# Patient Record
Sex: MALE | Race: WHITE | NOT HISPANIC OR LATINO
[De-identification: names, ages, dates, MRNs, and addresses within clinical notes are randomized per-mention and may not be internally consistent; named-entity substitution may affect disease eponyms.]

---

## 2020-07-28 ENCOUNTER — TRANSCRIPTION ENCOUNTER (OUTPATIENT)
Age: 70
End: 2020-07-28

## 2020-08-18 ENCOUNTER — APPOINTMENT (OUTPATIENT)
Dept: UROLOGY | Facility: CLINIC | Age: 70
End: 2020-08-18
Payer: MEDICARE

## 2020-08-18 VITALS
BODY MASS INDEX: 32.2 KG/M2 | HEIGHT: 71 IN | TEMPERATURE: 98.6 F | RESPIRATION RATE: 17 BRPM | SYSTOLIC BLOOD PRESSURE: 118 MMHG | WEIGHT: 230 LBS | HEART RATE: 75 BPM | DIASTOLIC BLOOD PRESSURE: 75 MMHG

## 2020-08-18 PROCEDURE — 81003 URINALYSIS AUTO W/O SCOPE: CPT | Mod: QW

## 2020-08-18 PROCEDURE — 99213 OFFICE O/P EST LOW 20 MIN: CPT | Mod: 25

## 2020-08-18 RX ORDER — EZETIMIBE 10 MG/1
10 TABLET ORAL
Refills: 0 | Status: ACTIVE | COMMUNITY

## 2020-08-18 RX ORDER — CHROMIUM 200 MCG
TABLET ORAL
Refills: 0 | Status: ACTIVE | COMMUNITY

## 2020-08-18 NOTE — ASSESSMENT
[FreeTextEntry1] : 70 year old male presents for follow up 6 months post biopsy prostate. \par \par Free PSA and UA ordered. \par Will obtain prior records. \par \par FU 6 months pending above

## 2020-08-18 NOTE — PHYSICAL EXAM
[General Appearance - Well Developed] : well developed [Normal Appearance] : normal appearance [General Appearance - Well Nourished] : well nourished [General Appearance - In No Acute Distress] : no acute distress [Edema] : no peripheral edema [Well Groomed] : well groomed [Respiration, Rhythm And Depth] : normal respiratory rhythm and effort [Abdomen Soft] : soft [Exaggerated Use Of Accessory Muscles For Inspiration] : no accessory muscle use [Abdomen Tenderness] : non-tender [Costovertebral Angle Tenderness] : no ~M costovertebral angle tenderness [Urethral Meatus] : meatus normal [Scrotum] : the scrotum was normal [Testes Mass (___cm)] : there were no testicular masses [Anus Abnormality] : the anus and perineum were normal [Rectal Exam - Rectum] : digital rectal exam was normal [Prostate Tenderness] : the prostate was not tender [No Prostate Nodules] : no prostate nodules [Prostate Size ___ (0-4)] : prostate size [unfilled] (scale: 0-4) [Normal Station and Gait] : the gait and station were normal for the patient's age [] : no rash [No Focal Deficits] : no focal deficits [Affect] : the affect was normal [Oriented To Time, Place, And Person] : oriented to person, place, and time [Mood] : the mood was normal [Not Anxious] : not anxious [No Palpable Adenopathy] : no palpable adenopathy [Penis Abnormality] : normal circumcised penis [Testes Tenderness] : no tenderness of the testes [Epididymis] : the epididymides were normal

## 2020-08-18 NOTE — END OF VISIT
[FreeTextEntry3] : Medical record entries made by the scribe today, were at my direction and personally dictated to them by me, Dr. Tadeo Ashford on 08/18/2020. I have reviewed the chart and agree that the record accurately reflects my personal performance of the history, physical exam, assessment, and plan.

## 2020-08-18 NOTE — LETTER BODY
[Dear  ___] : Dear  [unfilled], [Courtesy Letter:] : I had the pleasure of seeing your patient, [unfilled], in my office today. [FreeTextEntry1] : doing well\par see below [FreeTextEntry3] : Tadeo Ashford MD FACS\par \par Attending Urologist-[facility]\par Director - Strategic Operations Urology\par Assistant Clinical Professor-[facility] at [facility]\par \par  [Please see my note below.] : Please see my note below.

## 2020-08-18 NOTE — HISTORY OF PRESENT ILLNESS
[FreeTextEntry1] : 70 year old male presents for follow up for prostate problems, elevated PSA. Pt was seen at my prior office for a prostate biopsy 6 months ago. Genomic tests were ordered at the time as well. Will obtain records. \par Patient denies pain, urinary symptoms.

## 2020-08-27 LAB
PSA FREE FLD-MCNC: 11 %
PSA FREE SERPL-MCNC: 0.63 NG/ML
PSA SERPL-MCNC: 5.5 NG/ML

## 2021-02-02 ENCOUNTER — APPOINTMENT (OUTPATIENT)
Dept: UROLOGY | Facility: CLINIC | Age: 71
End: 2021-02-02

## 2021-02-02 ENCOUNTER — NON-APPOINTMENT (OUTPATIENT)
Age: 71
End: 2021-02-02

## 2021-02-12 ENCOUNTER — RESULT CHARGE (OUTPATIENT)
Age: 71
End: 2021-02-12

## 2021-02-12 ENCOUNTER — APPOINTMENT (OUTPATIENT)
Dept: UROLOGY | Facility: CLINIC | Age: 71
End: 2021-02-12
Payer: MEDICARE

## 2021-02-12 VITALS
DIASTOLIC BLOOD PRESSURE: 77 MMHG | TEMPERATURE: 98.7 F | SYSTOLIC BLOOD PRESSURE: 128 MMHG | HEART RATE: 77 BPM | RESPIRATION RATE: 16 BRPM | OXYGEN SATURATION: 95 %

## 2021-02-12 DIAGNOSIS — R31.29 OTHER MICROSCOPIC HEMATURIA: ICD-10-CM

## 2021-02-12 PROCEDURE — 81003 URINALYSIS AUTO W/O SCOPE: CPT | Mod: QW

## 2021-02-12 PROCEDURE — 99213 OFFICE O/P EST LOW 20 MIN: CPT | Mod: 25

## 2021-02-12 NOTE — HISTORY OF PRESENT ILLNESS
[FreeTextEntry1] : 70 year old male following up elevated PSA, ED\par \par Biopsy 10/2019: benign\par \par MdX done- , 20% likelihood of detecting [place] score < 6 cancer. 12% likelihood of detecting sandy score > 7\par \par PSA 8/27/2020: 5.50, 0.63, 11%-stable\par \par on Cialis- doing well\par \par voiding well

## 2021-02-12 NOTE — END OF VISIT
[FreeTextEntry3] : Medical record entries made by the scribe today, were at my direction and personally dictated to them by me, Dr. Tadeo Ashford on 02/12/2021. I have reviewed the chart and agree that the record accurately reflects my personal performance of the history, physical exam, assessment, and plan.

## 2021-02-12 NOTE — PHYSICAL EXAM
[General Appearance - Well Developed] : well developed [Normal Appearance] : normal appearance [General Appearance - Well Nourished] : well nourished [Well Groomed] : well groomed [General Appearance - In No Acute Distress] : no acute distress [Abdomen Soft] : soft [Abdomen Tenderness] : non-tender [Costovertebral Angle Tenderness] : no ~M costovertebral angle tenderness [Penis Abnormality] : normal circumcised penis [Scrotum] : the scrotum was normal [Rectal Exam - Seminal Vesicles] : the seminal vesicles were normal [Epididymis] : the epididymides were normal [Testes Tenderness] : no tenderness of the testes [Testes Mass (___cm)] : there were no testicular masses [Anus Abnormality] : the anus and perineum were normal [Rectal Exam - Rectum] : digital rectal exam was normal [Prostate Tenderness] : the prostate was not tender [No Prostate Nodules] : no prostate nodules [Prostate Size ___ (0-4)] : prostate size [unfilled] (scale: 0-4)

## 2021-02-12 NOTE — ASSESSMENT
[FreeTextEntry1] : 70 year old male with elevated PSA, ED\par trace hematuria on UA dip\par \par UA micro, culture, cytology sent today\par \par Patient will continue cialis\par \par Await psa results. but in light of prior genomics will consider MRI of the prostate\par \par Follow up in 6 months pending PSA

## 2021-02-13 ENCOUNTER — TRANSCRIPTION ENCOUNTER (OUTPATIENT)
Age: 71
End: 2021-02-13

## 2021-02-13 LAB
APPEARANCE: CLEAR
BACTERIA: NEGATIVE
BILIRUBIN URINE: NEGATIVE
BLOOD URINE: NEGATIVE
COLOR: NORMAL
GLUCOSE QUALITATIVE U: NEGATIVE
HYALINE CASTS: 0 /LPF
KETONES URINE: NEGATIVE
LEUKOCYTE ESTERASE URINE: NEGATIVE
MICROSCOPIC-UA: NORMAL
NITRITE URINE: NEGATIVE
PH URINE: 7
PROTEIN URINE: NEGATIVE
PSA FREE FLD-MCNC: 12 %
PSA FREE SERPL-MCNC: 0.73 NG/ML
PSA SERPL-MCNC: 5.96 NG/ML
RED BLOOD CELLS URINE: 3 /HPF
SPECIFIC GRAVITY URINE: 1.01
SQUAMOUS EPITHELIAL CELLS: 0 /HPF
UROBILINOGEN URINE: NORMAL
WHITE BLOOD CELLS URINE: 0 /HPF

## 2021-02-14 LAB — BACTERIA UR CULT: NORMAL

## 2021-02-18 LAB — URINE CYTOLOGY: NORMAL

## 2021-05-08 ENCOUNTER — RESULT REVIEW (OUTPATIENT)
Age: 71
End: 2021-05-08

## 2021-05-08 ENCOUNTER — APPOINTMENT (OUTPATIENT)
Dept: MRI IMAGING | Facility: IMAGING CENTER | Age: 71
End: 2021-05-08
Payer: MEDICARE

## 2021-05-08 ENCOUNTER — OUTPATIENT (OUTPATIENT)
Dept: OUTPATIENT SERVICES | Facility: HOSPITAL | Age: 71
LOS: 1 days | End: 2021-05-08
Payer: MEDICARE

## 2021-05-08 DIAGNOSIS — R97.20 ELEVATED PROSTATE SPECIFIC ANTIGEN [PSA]: ICD-10-CM

## 2021-05-08 PROCEDURE — 76377 3D RENDER W/INTRP POSTPROCES: CPT

## 2021-05-08 PROCEDURE — G1004: CPT

## 2021-05-08 PROCEDURE — 76377 3D RENDER W/INTRP POSTPROCES: CPT | Mod: 26

## 2021-05-08 PROCEDURE — 72197 MRI PELVIS W/O & W/DYE: CPT

## 2021-05-08 PROCEDURE — 72197 MRI PELVIS W/O & W/DYE: CPT | Mod: 26,MG

## 2021-05-08 PROCEDURE — A9585: CPT

## 2021-05-17 ENCOUNTER — NON-APPOINTMENT (OUTPATIENT)
Age: 71
End: 2021-05-17

## 2021-05-26 ENCOUNTER — APPOINTMENT (OUTPATIENT)
Dept: UROLOGY | Facility: CLINIC | Age: 71
End: 2021-05-26
Payer: MEDICARE

## 2021-05-26 ENCOUNTER — APPOINTMENT (OUTPATIENT)
Dept: UROLOGY | Facility: HOSPITAL | Age: 71
End: 2021-05-26

## 2021-05-26 DIAGNOSIS — Z86.79 PERSONAL HISTORY OF OTHER DISEASES OF THE CIRCULATORY SYSTEM: ICD-10-CM

## 2021-05-26 PROCEDURE — 99213 OFFICE O/P EST LOW 20 MIN: CPT | Mod: 95

## 2021-05-26 NOTE — HISTORY OF PRESENT ILLNESS
[Home] : at home, [unfilled] , at the time of the visit. [Medical Office: ([facility])___] : at the medical office located in  [Verbal consent obtained from patient] : the patient, [unfilled] [FreeTextEntry1] : Patient is a 70 year old man with a history of elevated PSA. He has been referred for a transperineal fusion biopsy of the prostate with Tadeo Carlson MD.  PSA February 2021 5.96 ng.mL with a free PSA of 12%.  He had a MRI of the prostate that demonstrated a PI-RADS 4 lesion, right anterior midgland to apex transition zone. Prostate volume 93 cc. No extraprostatic extension, no seminal vesicle invasion or pelvic lymphadenopathy noted. He denies any family history of prostate cancer. He reports he had a biopsy October 2019 that was benign. He currently denies any bothersome urinary symptoms. \par \par Discussed transperineal fusion guided biopsy with the patient today.  Explained to patient that the MRI images and transrectal ultrasound images allows us to retrieve biopsy samples from the lesion seen on the MRI. We will also take samples from a 12 core biopsy template of the prostate to assess for the presence of clinically significant cancer.  Discussed the use of local anesthesia for this procedure, in addition to a Valium 5 mg tablet. Patient does not want Valium for the procedure. Reviewed the importance of a Fleet enema the morning of the procedure. The patient currently is on Aspirin 81 mg which he will continue. He takes a blood pressure medication which he will take the morning of the procedure. Discussed with patient that a transperineal approach has a low risk for infection. Reviewed with the patient that he may experience some blood in the urine for several days and blood in the ejaculation for a few weeks prior to the procedure.\par

## 2021-05-26 NOTE — HISTORY OF PRESENT ILLNESS
[Home] : at home, [unfilled] , at the time of the visit. [Medical Office: ([facility])___] : at the medical office located in  [Verbal consent obtained from patient] : the patient, [unfilled] [FreeTextEntry1] : Patient is a 70 year old man with a history of elevated PSA. He has been referred for a transperineal fusion biopsy of the prostate with Tadeo Carlson MD.  PSA February 2021 5.96 ng.mL with a free PSA of 12%.  He had a MRI of the prostate that demonstrated a PI-RADS 4 lesion, right anterior midgland to apex transition zone. Prostate volume 93 cc. No extraprostatic extension, no seminal vesicle invasion or pelvic lymphadenopathy noted. He denies any family history of prostate cancer. He reports he had a biopsy October 2019 that was benign. He currently denies any bothersome urinary symptoms. \par \par Discussed transperineal fusion guided biopsy with the patient today.  Explained to patient that the MRI images and transrectal ultrasound images allows us to retrieve biopsy samples from the lesion seen on the MRI. We will also take samples from a 12 core biopsy template of the prostate to assess for the presence of clinically significant cancer.  Discussed the use of local anesthesia for this procedure, in addition to a Valium 5 mg tablet. Patient does not want Valium for the procedure. Reviewed the importance of a Fleet enema the morning of the procedure. The patient currently is on Aspirin 81 mg which he will continue. He takes a blood pressure medication which he will take the morning of the procedure. Discussed with patient that a transperineal approach has a low risk for infection. Reviewed with the patient that he may experience some blood in the urine for several days and blood in the ejaculation for a few weeks prior to the procedure.\par

## 2021-05-26 NOTE — PHYSICAL EXAM
[General Appearance - Well Developed] : well developed [General Appearance - Well Nourished] : well nourished [Normal Appearance] : normal appearance [Well Groomed] : well groomed [General Appearance - In No Acute Distress] : no acute distress [] : no respiratory distress [Exaggerated Use Of Accessory Muscles For Inspiration] : no accessory muscle use [Oriented To Time, Place, And Person] : oriented to person, place, and time [Affect] : the affect was normal [Mood] : the mood was normal [Not Anxious] : not anxious

## 2021-05-26 NOTE — ASSESSMENT
[FreeTextEntry1] : Patient agrees to move forward with transperineal fusion biopsy with Dr. Tadeo Carlson MD. A written copy of instructions have been sent to the email address on file as requested. Patient has verbalized understanding of the procedure and instructions prior to his biopsy appointment. Arrival time reviewed with patient.\par

## 2021-05-26 NOTE — REASON FOR VISIT
[Home] : at home, [unfilled] , at the time of the visit. [Medical Office: ([facility])___] : at the medical office located in  [Spouse] : spouse [Verbal consent obtained from patient] : the patient, [unfilled] [Follow-up Visit ___] : a follow-up visit  for [unfilled]

## 2021-05-26 NOTE — PHYSICAL EXAM
[General Appearance - Well Developed] : well developed [General Appearance - Well Nourished] : well nourished [Normal Appearance] : normal appearance [Well Groomed] : well groomed [General Appearance - In No Acute Distress] : no acute distress [] : no respiratory distress [Exaggerated Use Of Accessory Muscles For Inspiration] : no accessory muscle use [Oriented To Time, Place, And Person] : oriented to person, place, and time [Not Anxious] : not anxious [Affect] : the affect was normal

## 2021-05-26 NOTE — HISTORY OF PRESENT ILLNESS
[FreeTextEntry1] : \par Patient is a 70 year old man with a history of elevated PSA. He has been referred for a transperineal fusion biopsy of the prostate with Tadeo Carlson MD.  PSA February 2021 5.96 ng.mL with a free PSA of 12%.  He had a MRI of the prostate that demonstrated a PI-RADS 4 lesion, right anterior midgland to apex transition zone. Prostate volume 93 cc. No extraprostatic extension, no seminal vesicle invasion or pelvic lymphadenopathy noted. He denies any family history of prostate cancer. He reports he had a biopsy October 2019 that was benign. He currently denies any bothersome urinary symptoms. \par \par Discussed transperineal fusion guided biopsy with the patient today.  Explained to patient that the MRI images and transrectal ultrasound images allows us to retrieve biopsy samples from the lesion seen on the MRI. We will also take samples from a 12 core biopsy template of the prostate to assess for the presence of clinically significant cancer.  Discussed the use of local anesthesia for this procedure, in addition to a Valium 5 mg tablet. Patient does not want Valium for the procedure. Reviewed the importance of a Fleet enema the morning of the procedure. The patient currently is on Aspirin 81 mg which he will continue. He takes a blood pressure medication which he will take the morning of the procedure. Discussed with patient that a transperineal approach has a low risk for infection. Reviewed with the patient that he may experience some blood in the urine for several days and blood in the ejaculation for a few weeks prior to the procedure.

## 2021-05-26 NOTE — ASSESSMENT
[FreeTextEntry1] : Patient agrees to move forward with transperineal fusion biopsy with Dr. Tadeo Calrson MD. A written copy of instructions have been sent to the email address on file as requested. Patient has verbalized understanding of the procedure and instructions prior to his biopsy appointment. Arrival time reviewed with patient.\par

## 2021-05-26 NOTE — REASON FOR VISIT
[Home] : at home, [unfilled] , at the time of the visit. [Medical Office: ([facility])___] : at the medical office located in  [Spouse] : spouse [Verbal consent obtained from patient] : the patient, [unfilled] [Follow-up Visit ___] : a follow-up visit  for [unfilled]

## 2021-05-27 ENCOUNTER — APPOINTMENT (OUTPATIENT)
Dept: UROLOGY | Facility: CLINIC | Age: 71
End: 2021-05-27
Payer: MEDICARE

## 2021-05-27 ENCOUNTER — OUTPATIENT (OUTPATIENT)
Dept: OUTPATIENT SERVICES | Facility: HOSPITAL | Age: 71
LOS: 1 days | End: 2021-05-27
Payer: MEDICARE

## 2021-05-27 VITALS — SYSTOLIC BLOOD PRESSURE: 119 MMHG | DIASTOLIC BLOOD PRESSURE: 76 MMHG | HEART RATE: 85 BPM

## 2021-05-27 VITALS
SYSTOLIC BLOOD PRESSURE: 131 MMHG | DIASTOLIC BLOOD PRESSURE: 78 MMHG | TEMPERATURE: 97.7 F | HEART RATE: 73 BPM | RESPIRATION RATE: 16 BRPM

## 2021-05-27 DIAGNOSIS — R97.20 ELEVATED PROSTATE SPECIFIC ANTIGEN [PSA]: ICD-10-CM

## 2021-05-27 DIAGNOSIS — R93.5 ABNORMAL FINDINGS ON DIAGNOSTIC IMAGING OF OTHER ABDOMINAL REGIONS, INCLUDING RETROPERITONEUM: ICD-10-CM

## 2021-05-27 DIAGNOSIS — R35.0 FREQUENCY OF MICTURITION: ICD-10-CM

## 2021-05-27 PROCEDURE — 55700: CPT

## 2021-05-27 PROCEDURE — 55700: CPT | Mod: 22

## 2021-05-27 PROCEDURE — 76942 ECHO GUIDE FOR BIOPSY: CPT | Mod: 26,59

## 2021-05-27 PROCEDURE — 76872 US TRANSRECTAL: CPT

## 2021-05-27 PROCEDURE — 76872 US TRANSRECTAL: CPT | Mod: 26

## 2021-05-27 PROCEDURE — 76377 3D RENDER W/INTRP POSTPROCES: CPT | Mod: 26

## 2021-05-27 PROCEDURE — 76942 ECHO GUIDE FOR BIOPSY: CPT | Mod: 59

## 2021-06-02 LAB — CORE LAB BIOPSY: NORMAL

## 2021-06-07 ENCOUNTER — APPOINTMENT (OUTPATIENT)
Dept: UROLOGY | Facility: CLINIC | Age: 71
End: 2021-06-07
Payer: MEDICARE

## 2021-06-07 VITALS
HEIGHT: 71 IN | DIASTOLIC BLOOD PRESSURE: 70 MMHG | RESPIRATION RATE: 17 BRPM | TEMPERATURE: 98.5 F | HEART RATE: 86 BPM | SYSTOLIC BLOOD PRESSURE: 118 MMHG

## 2021-06-07 PROCEDURE — 99214 OFFICE O/P EST MOD 30 MIN: CPT

## 2021-06-07 NOTE — HISTORY OF PRESENT ILLNESS
[FreeTextEntry1] : 70 year old male following up with elevated PSA\par \par MRI Prostate w/wo IV Cont on 5/25/21: PIRADS 4 - \par \par Prostate fusion Bx on 5/27/21, pathology results revealed adenocarcinoma of the prostate,\par Maryjo 6 in right anterior as well as in target lesin\par \par  Prognostic Grade Group 1\par \par Here for discussion\par \par

## 2021-06-07 NOTE — END OF VISIT
[FreeTextEntry3] : Medical record entries made by the scribe today, were at my direction and personally dictated to them by me, Dr. Tadeo Ashford on 06/07/2021. I have reviewed the chart and agree that the record accurately reflects my personal performance of the history, physical exam, assessment, and plan.\par

## 2021-06-07 NOTE — LETTER BODY
[Dear  ___] : Dear  [unfilled], [Courtesy Letter:] : I had the pleasure of seeing your patient, [unfilled], in my office today. [Please see my note below.] : Please see my note below. [Sincerely,] : Sincerely, [FreeTextEntry1] : low risk ca prostate\par likely to follow active surveillance [FreeTextEntry3] : Tadeo Ashford MD FACS\par \par Attending Urologist-[facility]\par Director - Strategic Operations Urology\par Associate Clinical Professor-[facility] at [facility]\par \par

## 2021-06-07 NOTE — ASSESSMENT
[FreeTextEntry1] : 70 year old male with Prostate CA\par \par \par \par Prostate Bx on 5/27/21, pathology results revealed Maryjo 6 adenocarcinoma of the prostate, Prognostic Grade Group 1\par \par \par Treatment options reviewed- active surveillance vs definitive therapy discussed\par \par I explained the patient in detail that active surveillance will include frequent visits for exam and  PSA .\par Every 3 months intiailly with repeat interval biopsy in first year and MRI then and at least q year.\par \par reviewed definitive therapy incl surgery, XRT, cryo, focal therapy among others\par \par . Rationale, benefit, risk, and alternatives reviewed. The patient was given a complete description of each option. \par \par Also recommended to see radiation Oncology consultation. Referral was given. All questions were answered.\par \par He will consider his options.but will likely do active surveillance\par \par FU in 3 months pending his choiice\par

## 2021-08-13 ENCOUNTER — APPOINTMENT (OUTPATIENT)
Dept: UROLOGY | Facility: CLINIC | Age: 71
End: 2021-08-13

## 2021-08-20 ENCOUNTER — TRANSCRIPTION ENCOUNTER (OUTPATIENT)
Age: 71
End: 2021-08-20

## 2021-09-14 ENCOUNTER — APPOINTMENT (OUTPATIENT)
Dept: UROLOGY | Facility: CLINIC | Age: 71
End: 2021-09-14
Payer: MEDICARE

## 2021-09-14 ENCOUNTER — RESULT CHARGE (OUTPATIENT)
Age: 71
End: 2021-09-14

## 2021-09-14 VITALS
DIASTOLIC BLOOD PRESSURE: 77 MMHG | SYSTOLIC BLOOD PRESSURE: 130 MMHG | OXYGEN SATURATION: 97 % | WEIGHT: 225 LBS | TEMPERATURE: 98.3 F | BODY MASS INDEX: 31.38 KG/M2 | HEART RATE: 75 BPM

## 2021-09-14 PROCEDURE — 99213 OFFICE O/P EST LOW 20 MIN: CPT

## 2021-09-14 NOTE — PHYSICAL EXAM
[General Appearance - Well Developed] : well developed [General Appearance - Well Nourished] : well nourished [Normal Appearance] : normal appearance [Well Groomed] : well groomed [General Appearance - In No Acute Distress] : no acute distress [Abdomen Soft] : soft [Abdomen Tenderness] : non-tender [Costovertebral Angle Tenderness] : no ~M costovertebral angle tenderness [Penis Abnormality] : normal circumcised penis [Scrotum] : the scrotum was normal [Rectal Exam - Seminal Vesicles] : the seminal vesicles were normal [Epididymis] : the epididymides were normal [Testes Tenderness] : no tenderness of the testes [Testes Mass (___cm)] : there were no testicular masses [Anus Abnormality] : the anus and perineum were normal [Rectal Exam - Rectum] : digital rectal exam was normal [Prostate Tenderness] : the prostate was not tender [No Prostate Nodules] : no prostate nodules [Prostate Size ___ (0-4)] : prostate size [unfilled] (scale: 0-4) [Urethral Meatus] : meatus normal

## 2021-09-16 RX ORDER — ROSUVASTATIN CALCIUM 20 MG/1
20 TABLET, FILM COATED ORAL
Qty: 90 | Refills: 0 | Status: ACTIVE | COMMUNITY
Start: 2021-03-22

## 2021-09-16 RX ORDER — LOSARTAN POTASSIUM AND HYDROCHLOROTHIAZIDE 12.5; 5 MG/1; MG/1
50-12.5 TABLET ORAL
Qty: 90 | Refills: 0 | Status: ACTIVE | COMMUNITY
Start: 2021-08-22

## 2021-09-16 NOTE — HISTORY OF PRESENT ILLNESS
[FreeTextEntry1] : 71 year old male with CA prostate\par PSA 9/14/21: 7.73\par \par PSA 2/13/21: 5.96, 0.73, 12%\par \par MRI Prostate w/wo IV Cont on 5/25/21: PIRADS 4 - \par \par Prostate fusion Bx on 5/27/21, pathology results revealed adenocarcinoma of the prostate,\par [place] 6 in right anterior as well as in target lesion\par \par  Prognostic Grade Group 1\par \par pt is doing well with no complaints\par \par On AS

## 2021-09-16 NOTE — END OF VISIT
[FreeTextEntry3] : Medical record entries made by the scribe today, were at my direction and personally dictated to them by me, Dr. Tadeo Ashford on 09/14/2021. I have reviewed the chart and agree that the record accurately reflects my personal performance of the history, physical exam, assessment, and plan.

## 2021-09-16 NOTE — ASSESSMENT
[FreeTextEntry1] : 71 year old male with prostate cancer- on AS\par pt is doing well\par recent PSA reviewed\par once again discussed need for frequent visits for exam and PSA\par \par To follow up in 3 months- will send for MRI at this time\par eventual interval biopsy

## 2021-12-28 ENCOUNTER — RESULT CHARGE (OUTPATIENT)
Age: 71
End: 2021-12-28

## 2021-12-28 ENCOUNTER — APPOINTMENT (OUTPATIENT)
Dept: UROLOGY | Facility: CLINIC | Age: 71
End: 2021-12-28
Payer: MEDICARE

## 2021-12-28 VITALS
HEART RATE: 76 BPM | SYSTOLIC BLOOD PRESSURE: 134 MMHG | BODY MASS INDEX: 36.22 KG/M2 | TEMPERATURE: 98.1 F | HEIGHT: 70 IN | OXYGEN SATURATION: 95 % | DIASTOLIC BLOOD PRESSURE: 86 MMHG | WEIGHT: 253 LBS

## 2021-12-28 PROCEDURE — 81003 URINALYSIS AUTO W/O SCOPE: CPT | Mod: QW

## 2021-12-28 PROCEDURE — 99213 OFFICE O/P EST LOW 20 MIN: CPT

## 2021-12-30 ENCOUNTER — NON-APPOINTMENT (OUTPATIENT)
Age: 71
End: 2021-12-30

## 2021-12-30 LAB
APPEARANCE: CLEAR
BACTERIA: NEGATIVE
BILIRUBIN URINE: NEGATIVE
BLOOD URINE: ABNORMAL
COLOR: NORMAL
GLUCOSE QUALITATIVE U: NEGATIVE
HYALINE CASTS: 0 /LPF
KETONES URINE: NEGATIVE
LEUKOCYTE ESTERASE URINE: NEGATIVE
MICROSCOPIC-UA: NORMAL
NITRITE URINE: NEGATIVE
PH URINE: 6.5
PROTEIN URINE: NEGATIVE
PSA SERPL-MCNC: 7.25 NG/ML
RED BLOOD CELLS URINE: 1 /HPF
SPECIFIC GRAVITY URINE: 1.02
SQUAMOUS EPITHELIAL CELLS: 0 /HPF
UROBILINOGEN URINE: NORMAL
WHITE BLOOD CELLS URINE: 0 /HPF

## 2022-01-05 LAB
BACTERIA UR CULT: ABNORMAL
URINE CYTOLOGY: NORMAL

## 2022-01-06 ENCOUNTER — NON-APPOINTMENT (OUTPATIENT)
Age: 72
End: 2022-01-06

## 2022-01-09 ENCOUNTER — RESULT REVIEW (OUTPATIENT)
Age: 72
End: 2022-01-09

## 2022-01-09 ENCOUNTER — OUTPATIENT (OUTPATIENT)
Dept: OUTPATIENT SERVICES | Facility: HOSPITAL | Age: 72
LOS: 1 days | End: 2022-01-09
Payer: MEDICARE

## 2022-01-09 ENCOUNTER — APPOINTMENT (OUTPATIENT)
Dept: MRI IMAGING | Facility: IMAGING CENTER | Age: 72
End: 2022-01-09
Payer: MEDICARE

## 2022-01-09 DIAGNOSIS — C61 MALIGNANT NEOPLASM OF PROSTATE: ICD-10-CM

## 2022-01-09 PROCEDURE — A9585: CPT

## 2022-01-09 PROCEDURE — 76498 UNLISTED MR PROCEDURE: CPT

## 2022-01-09 PROCEDURE — 72197 MRI PELVIS W/O & W/DYE: CPT | Mod: ME

## 2022-01-09 PROCEDURE — 76498P: CUSTOM | Mod: 26,MH

## 2022-01-09 PROCEDURE — G1004: CPT

## 2022-01-09 PROCEDURE — 72197 MRI PELVIS W/O & W/DYE: CPT | Mod: 26,ME

## 2022-01-09 NOTE — PHYSICAL EXAM
[General Appearance - Well Developed] : well developed [General Appearance - Well Nourished] : well nourished [Normal Appearance] : normal appearance [Well Groomed] : well groomed [General Appearance - In No Acute Distress] : no acute distress [Abdomen Soft] : soft [Abdomen Tenderness] : non-tender [Costovertebral Angle Tenderness] : no ~M costovertebral angle tenderness [Urethral Meatus] : meatus normal [Penis Abnormality] : normal circumcised penis [Scrotum] : the scrotum was normal [Rectal Exam - Seminal Vesicles] : the seminal vesicles were normal [Epididymis] : the epididymides were normal [Testes Tenderness] : no tenderness of the testes [Testes Mass (___cm)] : there were no testicular masses [Anus Abnormality] : the anus and perineum were normal [Rectal Exam - Rectum] : digital rectal exam was normal [Prostate Tenderness] : the prostate was not tender [No Prostate Nodules] : no prostate nodules [Prostate Size ___ gm] : prostate size [unfilled] gm [Prostate Size ___ (0-4)] : prostate size [unfilled] (scale: 0-4)

## 2022-01-09 NOTE — HISTORY OF PRESENT ILLNESS
[FreeTextEntry1] : 71 year old male on AS for prostate cancer (Sandy 3+3=6 )\par \par Fusion Biopsy 5/27/21: adenocarcinoma of the prostate, [place] 6 in right anterior as well as in target lesion Prognostic Grade Group 1\par \par MRI Prostate 5/25/21: PIRADS 4\par \par MdX done- , 20% likelihood of detecting [place] score < 6 cancer. 12% likelihood of detecting sandy score > 7\par \par Biopsy 10/2019: benign\par \par PSA 9/14/21: 7.73\par PSA 2/13/21: 5.96, 0.73, 12%\par PSA 8/27/2020: 5.50, 0.63, 11%\par

## 2022-01-09 NOTE — ADDENDUM
[FreeTextEntry1] : Medical record entries made by the scribe today, were at my direction and personally dictated to them by me, Dr. Tadeo Ashford on 12/28/2021. I have reviewed the chart and agree that the record accurately reflects my personal performance of the history, physical exam, assessment, and plan.

## 2022-01-09 NOTE — ASSESSMENT
[FreeTextEntry1] : 71 year old male on AS for prostate cancer (Maryjo 3+3=6), microhematuria- on UA dip\par \par \par UA micro, culture, cytology, PSA sent today\par \par to go for MR Prostate\par \par interval biopsy pending \par \par Follow up in 3 months

## 2022-01-09 NOTE — END OF VISIT
[FreeTextEntry3] : Medical record entries made by the scribe today, were at my direction and personally dictated to them by me, Dr. Tadeo Ashford on 12/28/2021. I have reviewed the chart and agree that the record accurately reflects my personal performance of the history, physical exam, assessment, and plan.

## 2022-01-21 ENCOUNTER — APPOINTMENT (OUTPATIENT)
Dept: UROLOGY | Facility: CLINIC | Age: 72
End: 2022-01-21
Payer: MEDICARE

## 2022-01-21 VITALS
OXYGEN SATURATION: 96 % | TEMPERATURE: 98 F | WEIGHT: 252 LBS | BODY MASS INDEX: 36.08 KG/M2 | DIASTOLIC BLOOD PRESSURE: 75 MMHG | HEIGHT: 70 IN | SYSTOLIC BLOOD PRESSURE: 113 MMHG | HEART RATE: 76 BPM

## 2022-01-21 DIAGNOSIS — N39.0 URINARY TRACT INFECTION, SITE NOT SPECIFIED: ICD-10-CM

## 2022-01-21 PROCEDURE — 99213 OFFICE O/P EST LOW 20 MIN: CPT

## 2022-01-22 PROBLEM — N39.0 ACUTE LOWER UTI: Status: RESOLVED | Noted: 2022-01-05 | Resolved: 2022-02-04

## 2022-01-22 LAB
APPEARANCE: CLEAR
BACTERIA: NEGATIVE
BILIRUBIN URINE: NEGATIVE
BLOOD URINE: NORMAL
COLOR: YELLOW
GLUCOSE QUALITATIVE U: NORMAL
HYALINE CASTS: 1 /LPF
KETONES URINE: NEGATIVE
LEUKOCYTE ESTERASE URINE: NEGATIVE
MICROSCOPIC-UA: NORMAL
NITRITE URINE: NEGATIVE
PH URINE: 5.5
PROTEIN URINE: NEGATIVE
PSA SERPL-MCNC: 7 NG/ML
RED BLOOD CELLS URINE: 3 /HPF
SPECIFIC GRAVITY URINE: 1.02
SQUAMOUS EPITHELIAL CELLS: 0 /HPF
UROBILINOGEN URINE: NORMAL
WHITE BLOOD CELLS URINE: 0 /HPF

## 2022-01-22 RX ORDER — CEFDINIR 300 MG/1
300 CAPSULE ORAL TWICE DAILY
Qty: 14 | Refills: 0 | Status: DISCONTINUED | COMMUNITY
Start: 2022-01-05 | End: 2022-01-22

## 2022-01-22 NOTE — ASSESSMENT
[FreeTextEntry1] : 71 year old male on AS for prostate cancer (Maryjo 3+3=6)\par \par UA micro, culture, PSA sent  today\par \par reviewed MRI with patient-PIRAD 4\par \par discussed need for interval fusion biopsy\par \par Follow up with Dr. Carlson for fusion biopsy at 450 then f/u here week later

## 2022-01-22 NOTE — HISTORY OF PRESENT ILLNESS
[FreeTextEntry1] : 71 year old male on AS for prostate cancer (Sandy 3+3=6 )\par \par Biopsy 10/2019: benign-however MdX done- , 20% likelihood of detecting [place] score < 6 cancer. 12% likelihood of detecting sandy score > 7\par \par MRI Prostate 5//21: PIRADS 4\par \par Fusion Biopsy 5/27/21: adenocarcinoma of the prostate, [place] 6 in right anterior as well as in target lesion Prognostic Grade Group 1\par \par \par MRI Prostate 1/9/22:  79 cc gland. High suspicion lesion in right anterior peripheral zone unchanged.\par *PIRADS 4 - High (clinically significant cancer is likely to be present)\par \par \par PSA 12/27/21: 7.25- positive culture same day-repeat sent today\par PSA: 2/12/21: 5.96\par PSA 9/19/20: 5.50\par \par positive culture 1/5/22- course of cefidnir\par \par here today for c/s and review of MRI\par \par has had no symptoms of dysuria, fever etc

## 2022-01-22 NOTE — END OF VISIT
[FreeTextEntry3] : Medical record entries made by the scribe today, were at my direction and personally dictated to them by me, Dr. Tadeo Ashford on 01/21/2022. I have reviewed the chart and agree that the record accurately reflects my personal performance of the history, physical exam, assessment, and plan.

## 2022-01-23 LAB — BACTERIA UR CULT: NORMAL

## 2022-02-02 ENCOUNTER — APPOINTMENT (OUTPATIENT)
Dept: UROLOGY | Facility: CLINIC | Age: 72
End: 2022-02-02
Payer: MEDICARE

## 2022-02-02 PROCEDURE — 99212 OFFICE O/P EST SF 10 MIN: CPT | Mod: 95

## 2022-02-02 NOTE — REASON FOR VISIT
[Home] : at home, [unfilled] , at the time of the visit. [Medical Office: ([facility])___] : at the medical office located in  [Verbal consent obtained from patient] : the patient, [unfilled] [Follow-up Visit ___] : a follow-up visit  for [unfilled]

## 2022-02-02 NOTE — PHYSICAL EXAM
[Normal Appearance] : normal appearance [General Appearance - In No Acute Distress] : no acute distress [] : no respiratory distress [Exaggerated Use Of Accessory Muscles For Inspiration] : no accessory muscle use [Oriented To Time, Place, And Person] : oriented to person, place, and time [Not Anxious] : not anxious

## 2022-02-02 NOTE — ASSESSMENT
[FreeTextEntry1] : Discussed transperineal fusion guided biopsy with the patient today.  Explained to patient that the MRI images and transrectal ultrasound images allows us to retrieve biopsy samples from the lesion seen on the MRI. We will also take samples from a 12 core biopsy template of the prostate to assess for the presence of clinically significant cancer.  Discussed the use of local anesthesia for this procedure, in addition to a Valium 5 mg tablet. Patient does not want  PO Valium for this procedure.  Reviewed the importance of a Fleet enema the morning of the procedure. The patient Tales Aspirin 81 mg which he will continue. He takes blood pressure medications, which he will take the morning of the procedure. Discussed with patient that a transperineal approach has a low risk for infection. Reviewed with the patient that he may experience some blood in the urine for several days and blood in the ejaculation for a few weeks after the procedure. Reviewed MRI. Discussed risks and benefits of a transperineal fusion biopsy. All questions answered. \par \par Patient agrees to move forward with transperineal fusion biopsy with Dr. Tadeo Carlson MD. A written copy of instructions have been sent to the email address on file as requested. Patient has verbalized understanding of the procedure and instructions prior to his biopsy appointment. Arrival time reviewed with patient.\par

## 2022-02-02 NOTE — HISTORY OF PRESENT ILLNESS
[FreeTextEntry1] : Patient is a 71 year old man with a history of [place] 6 prostate cancer. He has been referred for a transperineal fusion guided biopsy with Tadeo Carlson MD. PSA January 2022 7 ng/mL, PSA December 2021 7.25 ng/mL, PSA February 2021 5.96 ng/mL, with a free PSA of 12%. .A MRI of the prostate was performed which demonstrated a 9 x 10 x 13 mm PI-RADS 4 lesion, right anterior peripheral zone. Prostate volume 79 cc. No seminal vesicle invasion, no pelvic lymphadenopathy, no extraprostatic extension noted. He denies any family history of prostate cancer. He denies any bothersome voiding symptoms.\par \par Previous biopsy 2019- benign \par Fusion biopsy performed by Dr. Carlson May 2021 \par 2/13 cores Maryjo (3+3) 6, max involvement 60 %.

## 2022-02-08 ENCOUNTER — APPOINTMENT (OUTPATIENT)
Dept: UROLOGY | Facility: CLINIC | Age: 72
End: 2022-02-08
Payer: MEDICARE

## 2022-02-08 ENCOUNTER — OUTPATIENT (OUTPATIENT)
Dept: OUTPATIENT SERVICES | Facility: HOSPITAL | Age: 72
LOS: 1 days | End: 2022-02-08
Payer: MEDICARE

## 2022-02-08 VITALS — OXYGEN SATURATION: 94 % | SYSTOLIC BLOOD PRESSURE: 138 MMHG | HEART RATE: 86 BPM | DIASTOLIC BLOOD PRESSURE: 76 MMHG

## 2022-02-08 VITALS — DIASTOLIC BLOOD PRESSURE: 79 MMHG | SYSTOLIC BLOOD PRESSURE: 127 MMHG

## 2022-02-08 DIAGNOSIS — R97.20 ELEVATED PROSTATE SPECIFIC ANTIGEN [PSA]: ICD-10-CM

## 2022-02-08 DIAGNOSIS — R97.20 ELEVATED PROSTATE, SPECIFIC ANTIGEN [PSA]: ICD-10-CM

## 2022-02-08 DIAGNOSIS — R93.5 ABNORMAL FINDINGS ON DIAGNOSTIC IMAGING OF OTHER ABDOMINAL REGIONS, INCLUDING RETROPERITONEUM: ICD-10-CM

## 2022-02-08 DIAGNOSIS — R35.0 FREQUENCY OF MICTURITION: ICD-10-CM

## 2022-02-08 DIAGNOSIS — C61 MALIGNANT NEOPLASM OF PROSTATE: ICD-10-CM

## 2022-02-08 PROCEDURE — 76377 3D RENDER W/INTRP POSTPROCES: CPT | Mod: 26

## 2022-02-08 PROCEDURE — 55700: CPT | Mod: 22

## 2022-02-08 PROCEDURE — 76942 ECHO GUIDE FOR BIOPSY: CPT | Mod: 26,59

## 2022-02-08 PROCEDURE — 76942 ECHO GUIDE FOR BIOPSY: CPT | Mod: 59

## 2022-02-08 PROCEDURE — 76872 US TRANSRECTAL: CPT

## 2022-02-08 PROCEDURE — 55700: CPT

## 2022-02-09 ENCOUNTER — NON-APPOINTMENT (OUTPATIENT)
Age: 72
End: 2022-02-09

## 2022-02-12 LAB — CORE LAB BIOPSY: NORMAL

## 2022-02-15 ENCOUNTER — APPOINTMENT (OUTPATIENT)
Dept: UROLOGY | Facility: CLINIC | Age: 72
End: 2022-02-15
Payer: MEDICARE

## 2022-02-15 PROCEDURE — 99214 OFFICE O/P EST MOD 30 MIN: CPT

## 2022-02-18 NOTE — ASSESSMENT
[FreeTextEntry1] : 71 year old male on AS for Maryjo 6 prostate ca\par \par 30 minute discussion with pt regarding pathology and options in great detail \par \par This demonstrates similar path but sl increase in volume over prior biopsy\par It is still low risk disease and compatible with AS\par \par I did spend a fair bit of time reviewing definitive therapy if he choses\par \par Reviewed all options surrounding definitive therapy\par Recommended definitive therapy, discussed radiation, surg, XRT, HIFU, and cryo, amongst others. \par Risks/benefits reviewed as well - answered all questions. Pt understood.  \par \par He is inclined to continue AS at this time but would like to have a consultation with Aldo re: possible RARP\par \par Will also make 3 month  f/u here should he continue with AS\par Again discussed need for careful f/u and probable repeat biopsy in one year opr earlier, \par

## 2022-02-18 NOTE — END OF VISIT
[FreeTextEntry3] : Medical record entries made by the scribe today, were at my direction and personally dictated to them by me, Dr. Tadeo Ashford on 02/15/2022. I have reviewed the chart and agree that the record accurately reflects my personal performance of the history, physical exam, assessment, and plan.

## 2022-02-18 NOTE — HISTORY OF PRESENT ILLNESS
[FreeTextEntry1] : 71 year old male on AS for Maryjo 6 prostate ca here today to discuss biopsy results \par \par MRI Prostate 5/8/21: PIRADS 4\par \par Fusion Biopsy 5/27/21: adenocarcinoma of the prostate, Maryjo 6 in right anterior as well as in target lesion Prognostic Grade Group 1\par \par MRI Prostate 1/9/22: 79 cc gland. High suspicion lesion in right anterior peripheral zone unchanged.\par *PIRADS 4 \par \par most recent biopsy 2/8/2022: Maryjo 6 in right anterior involving 70% of 1 of 1 core and [place] 6 in MRI target lesion 1 right anterior PZ involving 70% and 25% of 3 of 3 cores - similar results to biopsy from last year but sl increase volume\par \par most recent PSA on 1/20/2022: 7 \par PSA 12/27/2021: 7.25 \par PSA 2/12/2021: 5.96 \par PSA 8/19/2020: 5.50 \par \par UA micro and culture 1/20/2022: both negative

## 2022-02-18 NOTE — LETTER BODY
[Dear  ___] : Dear  [unfilled], [Courtesy Letter:] : I had the pleasure of seeing your patient, [unfilled], in my office today. [Please see my note below.] : Please see my note below. [Sincerely,] : Sincerely, [FreeTextEntry1] : see below\par \par sl increase in disease volume but will likely stay on AS for now [FreeTextEntry3] : Tadeo Ashford MD FACS\par \par Attending Urologist-[facility]\par Chief-Urology Division [facility]\par Associate Clinical Professor-[employer] [facility] [employer] at [facility]\par \par

## 2022-02-24 ENCOUNTER — APPOINTMENT (OUTPATIENT)
Dept: UROLOGY | Facility: CLINIC | Age: 72
End: 2022-02-24
Payer: MEDICARE

## 2022-02-24 VITALS
SYSTOLIC BLOOD PRESSURE: 121 MMHG | WEIGHT: 252 LBS | HEIGHT: 70 IN | HEART RATE: 76 BPM | TEMPERATURE: 98 F | DIASTOLIC BLOOD PRESSURE: 66 MMHG | BODY MASS INDEX: 36.08 KG/M2

## 2022-02-24 PROCEDURE — 99215 OFFICE O/P EST HI 40 MIN: CPT

## 2022-03-29 ENCOUNTER — APPOINTMENT (OUTPATIENT)
Dept: UROLOGY | Facility: CLINIC | Age: 72
End: 2022-03-29

## 2022-05-24 ENCOUNTER — APPOINTMENT (OUTPATIENT)
Dept: UROLOGY | Facility: CLINIC | Age: 72
End: 2022-05-24
Payer: MEDICARE

## 2022-05-24 VITALS
DIASTOLIC BLOOD PRESSURE: 74 MMHG | OXYGEN SATURATION: 97 % | WEIGHT: 252 LBS | HEART RATE: 73 BPM | BODY MASS INDEX: 36.16 KG/M2 | SYSTOLIC BLOOD PRESSURE: 111 MMHG | TEMPERATURE: 98.2 F

## 2022-05-24 PROCEDURE — 81003 URINALYSIS AUTO W/O SCOPE: CPT | Mod: QW

## 2022-05-24 PROCEDURE — 99213 OFFICE O/P EST LOW 20 MIN: CPT

## 2022-05-24 RX ORDER — LOSARTAN POTASSIUM 50 MG/1
50 TABLET, FILM COATED ORAL
Refills: 0 | Status: DISCONTINUED | COMMUNITY
End: 2022-05-24

## 2022-05-24 RX ORDER — PRAVASTATIN SODIUM 40 MG/1
40 TABLET ORAL
Refills: 0 | Status: DISCONTINUED | COMMUNITY
End: 2022-05-24

## 2022-05-29 LAB
APPEARANCE: CLEAR
BACTERIA UR CULT: NORMAL
BACTERIA: NEGATIVE
BILIRUBIN URINE: NEGATIVE
BLOOD URINE: NEGATIVE
COLOR: YELLOW
GLUCOSE QUALITATIVE U: NEGATIVE
HYALINE CASTS: 0 /LPF
KETONES URINE: NEGATIVE
LEUKOCYTE ESTERASE URINE: NEGATIVE
MICROSCOPIC-UA: NORMAL
NITRITE URINE: NEGATIVE
PH URINE: 6
PROTEIN URINE: NORMAL
RED BLOOD CELLS URINE: 2 /HPF
SPECIFIC GRAVITY URINE: 1.02
SQUAMOUS EPITHELIAL CELLS: 0 /HPF
URINE CYTOLOGY: NORMAL
UROBILINOGEN URINE: NORMAL
WHITE BLOOD CELLS URINE: 0 /HPF

## 2022-05-29 NOTE — END OF VISIT
[FreeTextEntry3] : Medical record entries made by the scribe today, were at my direction and personally dictated to them by me, Dr. Tadeo Ashford on 05/24/2022. I have reviewed the chart and agree that the record accurately reflects my personal performance of the history, physical exam, assessment, and plan.

## 2022-05-29 NOTE — ASSESSMENT
[FreeTextEntry1] : 71 year old male with Maryjo 6 prostate ca on AS \par \par UA micro, culture, cytology PSA sent today \par \par pt refused EMMANUEL today\par \par will RTO early next week for exam \par \par cont AS-MRI  Feb 23

## 2022-05-29 NOTE — HISTORY OF PRESENT ILLNESS
[FreeTextEntry1] : 71 year old male with h/o elevated PSA and [place] 6 prostate ca on AS here today for follow up \par \par MRI prostate 1/9/22: 79 cc gland. *PIRADS 4 \par \par biopsy 2/8/2022: Maryjo 6 in right anterior involving 70% of 1 of 1 core and Maryjo 6 in MRI target lesion 1 right anterior PZ involving 70% and 25% of 3 of 3 cores - similar results to biopsy from last year but sl increase volume\par \par PSA trend-\par 1/20/22: 7 - elevated \par 12/27/21: 7.25\par 2/12/21: 5.96\par 8/19/20: 5.50 \par \par today, pt notes doing well overall \par no complaints \par \par recently saw Aldo re: definitive treatments including focal therapy-pt wants to continue AS

## 2022-05-31 ENCOUNTER — APPOINTMENT (OUTPATIENT)
Dept: UROLOGY | Facility: CLINIC | Age: 72
End: 2022-05-31
Payer: MEDICARE

## 2022-05-31 VITALS
OXYGEN SATURATION: 97 % | TEMPERATURE: 97.8 F | BODY MASS INDEX: 36.08 KG/M2 | DIASTOLIC BLOOD PRESSURE: 71 MMHG | HEART RATE: 67 BPM | WEIGHT: 252 LBS | HEIGHT: 70 IN | SYSTOLIC BLOOD PRESSURE: 115 MMHG

## 2022-05-31 PROCEDURE — 99213 OFFICE O/P EST LOW 20 MIN: CPT

## 2022-05-31 NOTE — HISTORY OF PRESENT ILLNESS
[FreeTextEntry1] : 71 year old male with h/o elevated PSA and Maryjo 6 prostate ca on AS here today for follow up \par \par MRI prostate 1/9/2022: PIRADS 4 \par \par biopsy 2/8/2022: [place] 6 in right anterior involving 70% of 1 of 1 core and [place] 6 in MRI target lesion 1 right anterior PZ involving 70% and 25% of 3 of 3 cores - \par \par *similar results to biopsy from last year but sl increase volume\par \par PSA trend-\par 5/23/22: 7.85 - elevated \par 1/20/22: 7  \par 12/27/21: 7.25 \par 2/12/21: 5.96 \par 8/19/20: 5.50 \par \par refused EMMANUEL last week- here today for exam and to  review labs\par \par today, pt notes doing well \par no complaints \par \par UA micro 5/24/2022: negative \par culture 5/24/2022: negative \par cytology 5/24/2022: negative

## 2022-05-31 NOTE — ASSESSMENT
[FreeTextEntry1] : 71 year old male with elevated PSA and Maryjo 6 prostate ca on AS \par \par 30 minute d/w pt re changes in biopsy reports and PSA elevation\par \par he has seen Dr. Carlson as well re: definitive therapy including focal therapy\par \par discussed possible significance of progression based on most recent biopsy \par \par also suggested pt to reconsider definitive treatments including focal therapy \par \par he declines for now despite our discussion today\par \par pt understood\par \par f/u in 3 months for psa, exam, and further discussion re options

## 2022-05-31 NOTE — PHYSICAL EXAM
[General Appearance - Well Developed] : well developed [General Appearance - Well Nourished] : well nourished [Normal Appearance] : normal appearance [Well Groomed] : well groomed [General Appearance - In No Acute Distress] : no acute distress [Abdomen Soft] : soft [Abdomen Tenderness] : non-tender [Costovertebral Angle Tenderness] : no ~M costovertebral angle tenderness [Urethral Meatus] : meatus normal [Scrotum] : the scrotum was normal [Testes Mass (___cm)] : there were no testicular masses [No Prostate Nodules] : no prostate nodules [Prostate Size ___ (0-4)] : prostate size [unfilled] (scale: 0-4) [Penis Abnormality] : normal circumcised penis [Epididymis] : the epididymides were normal [Testes Tenderness] : no tenderness of the testes [Anus Abnormality] : the anus and perineum were normal [Rectal Exam - Rectum] : digital rectal exam was normal [Prostate Tenderness] : the prostate was not tender

## 2022-05-31 NOTE — END OF VISIT
[FreeTextEntry3] : Medical record entries made by the scribe today, were at my direction and personally dictated to them by me, Dr. Tadeo Ashford on 05/31/2022. I have reviewed the chart and agree that the record accurately reflects my personal performance of the history, physical exam, assessment, and plan.

## 2022-06-05 LAB — PSA SERPL-MCNC: 7.85 NG/ML

## 2022-09-30 ENCOUNTER — APPOINTMENT (OUTPATIENT)
Dept: UROLOGY | Facility: CLINIC | Age: 72
End: 2022-09-30

## 2022-09-30 VITALS
DIASTOLIC BLOOD PRESSURE: 80 MMHG | OXYGEN SATURATION: 97 % | SYSTOLIC BLOOD PRESSURE: 122 MMHG | TEMPERATURE: 97.8 F | HEART RATE: 74 BPM

## 2022-09-30 LAB
BILIRUB UR QL STRIP: NORMAL
CLARITY UR: CLEAR
COLLECTION METHOD: NORMAL
GLUCOSE UR-MCNC: 250
HCG UR QL: 1 EU/DL
HGB UR QL STRIP.AUTO: NORMAL
KETONES UR-MCNC: NORMAL
LEUKOCYTE ESTERASE UR QL STRIP: NORMAL
NITRITE UR QL STRIP: NORMAL
PH UR STRIP: 5
PROT UR STRIP-MCNC: NORMAL
SP GR UR STRIP: 1.02

## 2022-09-30 PROCEDURE — 99213 OFFICE O/P EST LOW 20 MIN: CPT

## 2022-09-30 NOTE — PHYSICAL EXAM
[Urethral Meatus] : meatus normal [Scrotum] : the scrotum was normal [Prostate Size ___ (0-4)] : prostate size [unfilled] (scale: 0-4) [General Appearance - Well Developed] : well developed [General Appearance - Well Nourished] : well nourished [Normal Appearance] : normal appearance [Well Groomed] : well groomed [General Appearance - In No Acute Distress] : no acute distress [Abdomen Soft] : soft [Abdomen Tenderness] : non-tender [Costovertebral Angle Tenderness] : no ~M costovertebral angle tenderness [Penis Abnormality] : normal circumcised penis [Epididymis] : the epididymides were normal [Testes Tenderness] : no tenderness of the testes [Testes Mass (___cm)] : there were no testicular masses [Anus Abnormality] : the anus and perineum were normal [Rectal Exam - Rectum] : digital rectal exam was normal [Prostate Tenderness] : the prostate was not tender [No Prostate Nodules] : no prostate nodules

## 2022-10-02 LAB
APPEARANCE: CLEAR
BACTERIA: NEGATIVE
BILIRUBIN URINE: NEGATIVE
BLOOD URINE: ABNORMAL
COLOR: YELLOW
GLUCOSE QUALITATIVE U: ABNORMAL
HYALINE CASTS: 0 /LPF
KETONES URINE: NEGATIVE
LEUKOCYTE ESTERASE URINE: NEGATIVE
MICROSCOPIC-UA: NORMAL
NITRITE URINE: NEGATIVE
PH URINE: 6.5
PROTEIN URINE: NEGATIVE
RED BLOOD CELLS URINE: 2 /HPF
SPECIFIC GRAVITY URINE: 1.02
SQUAMOUS EPITHELIAL CELLS: 0 /HPF
UROBILINOGEN URINE: NORMAL
WHITE BLOOD CELLS URINE: 0 /HPF

## 2022-10-02 NOTE — ASSESSMENT
[FreeTextEntry1] : 72 year old male with Maryjo 6 prostate ca on AS \par \par PSA sent today \par \par ua dip today showed trace blood - UA micro sent \par \par pending PSA,\par f/u 3 months \par reviewed possible significance of rising psa during AS\par \par reviewed options of definitive treatment

## 2022-10-02 NOTE — END OF VISIT
[FreeTextEntry3] : Medical record entries made by the scribe today, were at my direction and personally dictated to them by me, Dr. Tadeo Ashford on 09/30/2022. I have reviewed the chart and agree that the record accurately reflects my personal performance of the history, physical exam, assessment, and plan.

## 2022-11-27 LAB — PSA SERPL-MCNC: 9.34 NG/ML

## 2023-01-24 ENCOUNTER — APPOINTMENT (OUTPATIENT)
Dept: UROLOGY | Facility: CLINIC | Age: 73
End: 2023-01-24
Payer: MEDICARE

## 2023-01-24 VITALS
OXYGEN SATURATION: 98 % | DIASTOLIC BLOOD PRESSURE: 74 MMHG | SYSTOLIC BLOOD PRESSURE: 118 MMHG | TEMPERATURE: 97.9 F | HEART RATE: 73 BPM

## 2023-01-24 PROCEDURE — 99213 OFFICE O/P EST LOW 20 MIN: CPT

## 2023-01-24 NOTE — HISTORY OF PRESENT ILLNESS
[FreeTextEntry1] : 72 year old male with [place] 6 prostate ca on AS here today for follow up \par \par UA micro 9/30/22: showed trace blood\par \par PSA trend-\par 9/30/22: 9.34\par 9/26/22: 10.40 - done at pcp\par 5/23/22: 7.85 \par 1/20/22: 7 \par 12/27/21: 7.25 \par 2/12/21: 5.96 \par 8/19/20: 5.50 \par \par MRI 1/9/2022: PIRADS 4 \par \par interval biopsy 2/8/22-one core of sandy 6 plus three similar at target zone\par \par no issues with urination \par denies dysuria, hematuria \par

## 2023-01-24 NOTE — ASSESSMENT
[FreeTextEntry1] : 72 year old male with [place] 6 prostate ca on AS \par \par no new c/o\par \par requests refill of cialis-sent\par \par PSA sent today\par \par to get prostate mr\par \par \par pending mr and psa 6 months unless earlier bx

## 2023-01-24 NOTE — PHYSICAL EXAM
[General Appearance - Well Developed] : well developed [General Appearance - Well Nourished] : well nourished [Normal Appearance] : normal appearance [Well Groomed] : well groomed [General Appearance - In No Acute Distress] : no acute distress [Abdomen Soft] : soft [Abdomen Tenderness] : non-tender [Costovertebral Angle Tenderness] : no ~M costovertebral angle tenderness [Urethral Meatus] : meatus normal [Penis Abnormality] : normal circumcised penis [Scrotum] : the scrotum was normal [Epididymis] : the epididymides were normal [Testes Tenderness] : no tenderness of the testes [Testes Mass (___cm)] : there were no testicular masses [Anus Abnormality] : the anus and perineum were normal [Rectal Exam - Rectum] : digital rectal exam was normal [Prostate Enlargement] : the prostate was not enlarged [Prostate Tenderness] : the prostate was not tender [No Prostate Nodules] : no prostate nodules

## 2023-01-24 NOTE — END OF VISIT
[FreeTextEntry3] : Medical record entries made by the scribe today, were at my direction and personally dictated to them by me, Dr. Tadeo Ashford on 01/24/2023. I have reviewed the chart and agree that the record accurately reflects my personal performance of the history, physical exam, assessment, and plan.\par

## 2023-02-05 ENCOUNTER — APPOINTMENT (OUTPATIENT)
Dept: MRI IMAGING | Facility: IMAGING CENTER | Age: 73
End: 2023-02-05

## 2023-02-11 ENCOUNTER — APPOINTMENT (OUTPATIENT)
Dept: MRI IMAGING | Facility: IMAGING CENTER | Age: 73
End: 2023-02-11

## 2023-02-11 ENCOUNTER — OUTPATIENT (OUTPATIENT)
Dept: OUTPATIENT SERVICES | Facility: HOSPITAL | Age: 73
LOS: 1 days | End: 2023-02-11
Payer: MEDICARE

## 2023-02-11 ENCOUNTER — RESULT REVIEW (OUTPATIENT)
Age: 73
End: 2023-02-11

## 2023-02-11 DIAGNOSIS — C61 MALIGNANT NEOPLASM OF PROSTATE: ICD-10-CM

## 2023-02-11 PROCEDURE — 76498P: CUSTOM | Mod: 26,MH

## 2023-02-11 PROCEDURE — A9585: CPT

## 2023-02-11 PROCEDURE — 72197 MRI PELVIS W/O & W/DYE: CPT | Mod: 26,MH

## 2023-02-11 PROCEDURE — 72197 MRI PELVIS W/O & W/DYE: CPT | Mod: MH

## 2023-02-11 PROCEDURE — 76498 UNLISTED MR PROCEDURE: CPT

## 2023-02-22 ENCOUNTER — NON-APPOINTMENT (OUTPATIENT)
Age: 73
End: 2023-02-22

## 2023-03-02 ENCOUNTER — OUTPATIENT (OUTPATIENT)
Dept: OUTPATIENT SERVICES | Facility: HOSPITAL | Age: 73
LOS: 1 days | End: 2023-03-02
Payer: MEDICARE

## 2023-03-02 ENCOUNTER — APPOINTMENT (OUTPATIENT)
Dept: UROLOGY | Facility: CLINIC | Age: 73
End: 2023-03-02
Payer: MEDICARE

## 2023-03-02 ENCOUNTER — APPOINTMENT (OUTPATIENT)
Dept: UROLOGY | Facility: CLINIC | Age: 73
End: 2023-03-02

## 2023-03-02 VITALS — SYSTOLIC BLOOD PRESSURE: 114 MMHG | DIASTOLIC BLOOD PRESSURE: 71 MMHG | HEART RATE: 91 BPM

## 2023-03-02 VITALS — SYSTOLIC BLOOD PRESSURE: 133 MMHG | HEART RATE: 75 BPM | DIASTOLIC BLOOD PRESSURE: 78 MMHG

## 2023-03-02 PROCEDURE — 55700: CPT

## 2023-03-02 PROCEDURE — 76872 US TRANSRECTAL: CPT | Mod: 26

## 2023-03-02 PROCEDURE — 76872 US TRANSRECTAL: CPT

## 2023-03-03 ENCOUNTER — NON-APPOINTMENT (OUTPATIENT)
Age: 73
End: 2023-03-03

## 2023-03-08 DIAGNOSIS — C61 MALIGNANT NEOPLASM OF PROSTATE: ICD-10-CM

## 2023-03-24 ENCOUNTER — APPOINTMENT (OUTPATIENT)
Dept: UROLOGY | Facility: CLINIC | Age: 73
End: 2023-03-24
Payer: MEDICARE

## 2023-03-24 LAB — PSA SERPL-MCNC: 9.24 NG/ML

## 2023-03-24 PROCEDURE — 99215 OFFICE O/P EST HI 40 MIN: CPT

## 2023-03-24 NOTE — ASSESSMENT
[FreeTextEntry1] :  72 year old male with progressing [place] 7(-3+4) prostate ca presenting for follow up\par \par Maryjo score increase from 6 to 7 and PSA elevation indicate  progression of disease\par \par [place] score 7 (3+4)\par \par at length discussion-40 minutes-with pt regarding pathology and options,\par \par signifcance of 3+4, intermediate favorable disucssed\par \par reviewed all options including remaining on  active surveillance vs definitive therapy\par \par  recommending moving from active surveillance to more definitive therapy\par definitive therapy- rad, surg, XRT, HIFU, cryo, amongst others reviewed\par also discussed focal therapy\par \par \par reviewed genomic testing though would advise having definitive treatment regardless\par \par  d\par \par risks/benefits reviewed as well- answered all questions, pt understood\par \par pt wants genomics performed\par \par to arrange-pt to decide pending results\par \par \par

## 2023-03-24 NOTE — LETTER BODY
[Dear  ___] : Dear  [unfilled], [Courtesy Letter:] : I had the pleasure of seeing your patient, [unfilled], in my office today. [Please see my note below.] : Please see my note below. [Sincerely,] : Sincerely, [FreeTextEntry1] : see below-repeat biopsy c/w disease progression\par \par advise definitive therapy-he wants to wait for genomic testting first [FreeTextEntry3] : Tadeo Ashford MD FACS\par \par Attending Urologist-[facility]\par Chief-Urology Division [facility]\par Associate Clinical Professor-[facility] [employer] at [facility]\par \par

## 2023-03-24 NOTE — HISTORY OF PRESENT ILLNESS
[FreeTextEntry1] : 72 year old male with Maryjo 6 prostate ca here for prostate biopsy follow up\par \par Currently, on active surveillance\par \par s/p prostate biopsy 3/1/2023, results show right anterior area of [place]jo 3+4, 30% cancerous core with 5% of that being [place] pattern 4, which which has advanced from last biopsy where it was Maryjo 3+3\par \par PSA: 1/23/2023, 9.24\par 9/30/2022, 9.34\par 5/23/2022, 7.85\par 1/20/2022, 7.00\par 12/27/2021, 7.25\par 2/12/2021, 5.96\par 8/19/2020, 5.50\par \par IPSS: 12/28/2021, 4\par

## 2023-03-24 NOTE — END OF VISIT
[FreeTextEntry3] : All medical record entries made by the scribe today, were at my direction and personally dictated to them by me, Dr. Tadeo Ashford on 03/24/2023. I have reviewed the chart and agree that the record accurately reflects my personal performance of the history, physical exam, assessment, and plan. I have also personally directed, reviewed, and agreed with the chart.\par

## 2023-04-17 NOTE — HISTORY OF PRESENT ILLNESS
[FreeTextEntry1] : 72 year old male with  [place] 6 prostate ca on AS here today for follow up \par \par PSA trend-\par 9/26/22: 10.40 - done at pcp\par 5/23/22: 7.85 \par 1/20/22: 7 \par 12/27/21: 7.25 \par 2/12/21: 5.96 \par 8/19/20: 5.50 \par \par MRI 1/9/2022: PIRADS 4 \par \par interval biopsy 2/8/22-one core of sandy 6 plus three similar at target zone\par \par no issues with urination \par denies dysuria, hematuria  16-Apr-2023 23:00

## 2023-05-02 ENCOUNTER — APPOINTMENT (OUTPATIENT)
Dept: UROLOGY | Facility: CLINIC | Age: 73
End: 2023-05-02

## 2023-05-08 ENCOUNTER — APPOINTMENT (OUTPATIENT)
Dept: UROLOGY | Facility: CLINIC | Age: 73
End: 2023-05-08
Payer: MEDICARE

## 2023-05-08 VITALS
SYSTOLIC BLOOD PRESSURE: 106 MMHG | TEMPERATURE: 97.6 F | HEART RATE: 84 BPM | DIASTOLIC BLOOD PRESSURE: 67 MMHG | OXYGEN SATURATION: 97 %

## 2023-05-08 PROCEDURE — 99214 OFFICE O/P EST MOD 30 MIN: CPT

## 2023-05-16 ENCOUNTER — APPOINTMENT (OUTPATIENT)
Dept: RADIATION ONCOLOGY | Facility: CLINIC | Age: 73
End: 2023-05-16
Payer: MEDICARE

## 2023-05-16 VITALS
SYSTOLIC BLOOD PRESSURE: 123 MMHG | OXYGEN SATURATION: 98 % | BODY MASS INDEX: 35.59 KG/M2 | HEART RATE: 70 BPM | WEIGHT: 248.57 LBS | TEMPERATURE: 97.7 F | HEIGHT: 70 IN | RESPIRATION RATE: 17 BRPM | DIASTOLIC BLOOD PRESSURE: 80 MMHG

## 2023-05-16 PROCEDURE — 99205 OFFICE O/P NEW HI 60 MIN: CPT | Mod: 25,GC

## 2023-05-16 RX ORDER — DIAZEPAM 5 MG/1
5 TABLET ORAL
Qty: 2 | Refills: 0 | Status: DISCONTINUED | COMMUNITY
Start: 2023-02-17 | End: 2023-05-16

## 2023-05-16 RX ORDER — METFORMIN HYDROCHLORIDE 500 MG/1
500 TABLET, COATED ORAL DAILY
Refills: 0 | Status: ACTIVE | COMMUNITY
Start: 2023-05-16

## 2023-05-17 NOTE — VITALS
[Maximal Pain Intensity: 0/10] : 0/10 [Least Pain Intensity: 0/10] : 0/10 [90: Able to carry normal activity; minor signs or symptoms of disease.] : 90: Able to carry normal activity; minor signs or symptoms of disease.  [2 - Distress Level] : Distress Level: 2 [ECOG Performance Status: 0 - Fully active, able to carry on all pre-disease performance without restriction] : Performance Status: 0 - Fully active, able to carry on all pre-disease performance without restriction

## 2023-05-17 NOTE — REVIEW OF SYSTEMS
[Nocturia] : nocturia [Urinary Frequency] : urinary frequency [Joint Pain] : joint pain [Anxiety] : anxiety [Negative] : Integumentary [Eye Pain] : no eye pain [Visual Disturbances] : no visual disturbances [Dysphagia] : no dysphagia [Nosebleeds] : no nosebleeds [Chest Pain] : no chest pain [Palpitations] : no palpitations [Lower Ext Edema] : no lower extremity edema [Shortness Of Breath] : no shortness of breath [Cough] : no cough [Disturbance Of Gait] : no gait disturbance [Confused] : no confusion [Difficulty Walking] : no difficulty walking [Depression] : no depression [Easy Bleeding] : no tendency for easy bleeding [Easy Bruising] : no tendency for easy bruising [FreeTextEntry9] : hx arthritis

## 2023-05-17 NOTE — PHYSICAL EXAM
[Normal] : well developed, well nourished, in no acute distress [Sclera] : the sclera and conjunctiva were normal [Extraocular Movements] : extraocular movements were intact [Outer Ear] : the ears and nose were normal in appearance [Hearing Threshold Finger Rub Not [place]] : hearing was normal [Respiration, Rhythm And Depth] : normal respiratory rhythm and effort [Exaggerated Use Of Accessory Muscles For Inspiration] : no accessory muscle use [Abdomen Soft] : soft [Nondistended] : nondistended [Nail Clubbing] : no clubbing  or cyanosis of the fingernails [Range of Motion to Joints] : range of motion to joints [Skin Color & Pigmentation] : normal skin color and pigmentation [] : no rash [No Focal Deficits] : no focal deficits [Oriented To Time, Place, And Person] : oriented to person, place, and time [Affect] : the affect was normal [Arterial Pulses Normal] : the arterial pulses were normal [Edema] : no peripheral edema present [Motor Tone] : muscle strength and tone were normal

## 2023-05-17 NOTE — END OF VISIT
[Time Spent: ___ minutes] : I have spent [unfilled] minutes of time on the encounter. [FreeTextEntry3] : I saw and examined this patient on the date of service with my assigned resident physician, Dr. Buchanan. I was involved in all procedures and laboratory/radiographic assessments. I personally confirmed pertinent history and exam findings and reviewed the patient's diagnosis and plan with them. I have reviewed and edited the resident note and agree with their overall plan. Additional comments below.\par \par 72M w/ prostate cancer, previously on AS for G6(3+3) disease, now with progression to cT1c G7(3+4) iPSA 9.92 (5/8/23). MR prostate 2/11/23 had shown a 4.7 x 4.8 x 6.3 cm = 74 cc gland, with dominant lesion in the right anterior mid peripheral zone (1.5 cm, [place]-4); no GONZALEZ/SVI/LAD. Midline urethra, small median lobe. Prostate biopsy 3/1/23 had shown G7(3+4) disease in the right anterior (1/1, 30%) and G6(3+3) disease in the target lesion in the right anterior mid peripheral zone (2/6, 15-20%); 2/13 sites involved. Prolaris intermediate risk, 2.4 % metastasis risk at 10 years. \par \par We had an extensive discussion of the patient's imaging (MRI), labs, and prostate biopsies, and reviewed them together; I independently evaluated these and discussed their significance with the patient. I have also been involved in the multidisciplinary discussion of his care with his other providers. We discussed the natural history of prostate cancer and its management. \par \par He has favorable intermediate risk prostate cancer. Surveillance does remain an option, although with progression on AS I would favor definitive therapy to the prostate. I discussed options including surgery, brachytherapy, and external beam radiation therapy. Given his risk group, I do note feel that he needs androgen deprivation therapy. although it could be considered for cytoreduction. We reviewed the risks, benefits, and possible acute and long term adverse effects of these approaches. With his anatomy, I would favor SBRT or a hypofractionated EBRT approach if he chooses radiation therapy. He will consider his options, and we provided our contact information if he has further questions.

## 2023-05-17 NOTE — HISTORY OF PRESENT ILLNESS
[FreeTextEntry1] : Mr. Debehar is a  73 yo male with [place] 3+4=7 prostate adenocarcinoma. PSA 9.92 on 5/8/2023; has been on AS for G6(3+3) disease. \par \par He initially presents with elevated PSA in 2020, but denies urinary symptoms.He had 1st biopsy in August 2020  which showed [place] 3+3 adenocarcinoma. MR prostate in May 2021 revealed 93cc gland,  PI-RADS 4, in the context of PSA 5.96. \par \par He had a subsequent increase in PSA and additional MRI that prompted a 2nd biopsy for surveillance. This also showed [place] 3+3 disease but an increased disease volume. Prostate biopsy shows disease in the MRI target lesion at the right anterior peripheral zone and also at the right anterior template biopsy, overlapping the target lesion. No other samples from the template were positive for malignancy. In total, 3 out of 3 target samples and 1 out of 12 template samples were positive for [place]jo 6 prostate cancer with maximum 70% core length involved.\par \par 3/1/2023   prostate biopsy results show right anterior area of [place]jo 3+4, 30% cancerous core with 5% of that being [place] pattern 4, which which has advanced from last biopsy where it was [place] 3+3. Stage T1C , Favorable intermidate risk\par \par 5/8/23: 9.92\par 1/23/2023, 9.24\par 9/30/2023, 9.34\par 5/23/2022, 7.85\par 1/20/2022, 7.00\par 12/27/2021, 7.25\par 2/12/2021, 5.96\par 8/19/2020, 5.50\par \par Social Hx: owner of textile company, lives with spouse\par \par 5/16/23: Presents for evaluation \par Notes baseline good stream, nocturia 2x, slight urgency/frequency.  Denies leakage, dysuria, hematuria, bowel symptoms. Sexually active. Able to achieve erections but uses cialis occasionally.  He denies the use of tamsulosin or finasteride. \par IPSS/QOL/EPIC Score 7/2/3

## 2023-05-29 LAB — PSA SERPL-MCNC: 9.92 NG/ML

## 2023-05-29 NOTE — ASSESSMENT
[FreeTextEntry1] : 71 y/o male w sandy (3+4) 7 prostate cancer\par \par rise in PSA, sandy score increase indicate progression of disease,\par recommend starting definitive therapy,\par \par at length discussion (30 min) with pt regarding pathology and options\par \par reviewed all options if moving into definitive therapy,\par \par rad, surg, XRT, HIFU, cryo, amongst others\par \par reviewed significance of his prolaris testing and low likelihood of death in 10 years on AS or mets if he chooses treatment strategy\par \par risks/benefits reviewed as well, answered all questions, pt understood,\par \par opts to have discussion w Dr Patel re radiation therapy,\par \par

## 2023-05-29 NOTE — HISTORY OF PRESENT ILLNESS
[FreeTextEntry1] : 73 y/o male w sandy (3+4) 7 prostate cancer, on AS\par \par s/p prostate biopsy 3/1/2023, results show right anterior area of [place] 3+4, 30% cancerous core with 5% of that being [place] pattern 4, which which has advanced from last biopsy where it was [place] 3+3\par \par h/o elevated PSA, rising\par 1/23/2023, 9.24\par 9/30/2023, 9.34\par 5/23/2022, 7.85\par 1/20/2022, 7.00\par 12/27/2021, 7.25\par 2/12/2021, 5.96\par 8/19/2020, 5.50\par \par is on tamsulosin 0.8 mg and finasteride 5 mg\par here to review next steps-Prolaris sent out\par \par

## 2023-05-29 NOTE — END OF VISIT
[FreeTextEntry3] : All medical record entries made by the scribe today, were at my direction and personally dictated to them by me, Dr. Tadeo Ashford on 05/08/2023. I have reviewed the chart and agree that the record accurately reflects my personal performance of the history, physical exam, assessment, and plan. I have also personally directed, reviewed, and agreed with the chart.\par

## 2023-06-25 LAB — PROSTATE BIOPSY: NORMAL

## 2023-08-15 ENCOUNTER — APPOINTMENT (OUTPATIENT)
Dept: UROLOGY | Facility: CLINIC | Age: 73
End: 2023-08-15

## 2023-10-17 ENCOUNTER — APPOINTMENT (OUTPATIENT)
Dept: UROLOGY | Facility: CLINIC | Age: 73
End: 2023-10-17
Payer: MEDICARE

## 2023-10-17 VITALS
SYSTOLIC BLOOD PRESSURE: 125 MMHG | RESPIRATION RATE: 17 BRPM | BODY MASS INDEX: 35.07 KG/M2 | WEIGHT: 245 LBS | DIASTOLIC BLOOD PRESSURE: 70 MMHG | HEIGHT: 70 IN | HEART RATE: 86 BPM

## 2023-10-17 PROCEDURE — 99214 OFFICE O/P EST MOD 30 MIN: CPT

## 2023-11-07 ENCOUNTER — APPOINTMENT (OUTPATIENT)
Dept: COLORECTAL SURGERY | Facility: CLINIC | Age: 73
End: 2023-11-07
Payer: MEDICARE

## 2023-11-07 VITALS
DIASTOLIC BLOOD PRESSURE: 80 MMHG | OXYGEN SATURATION: 98 % | HEIGHT: 70 IN | TEMPERATURE: 97.8 F | BODY MASS INDEX: 35.07 KG/M2 | WEIGHT: 245 LBS | SYSTOLIC BLOOD PRESSURE: 137 MMHG | RESPIRATION RATE: 16 BRPM | HEART RATE: 73 BPM

## 2023-11-07 DIAGNOSIS — Z85.46 PERSONAL HISTORY OF MALIGNANT NEOPLASM OF PROSTATE: ICD-10-CM

## 2023-11-07 DIAGNOSIS — Z82.49 FAMILY HISTORY OF ISCHEMIC HEART DISEASE AND OTHER DISEASES OF THE CIRCULATORY SYSTEM: ICD-10-CM

## 2023-11-07 DIAGNOSIS — Z86.39 PERSONAL HISTORY OF OTHER ENDOCRINE, NUTRITIONAL AND METABOLIC DISEASE: ICD-10-CM

## 2023-11-07 PROCEDURE — 99203 OFFICE O/P NEW LOW 30 MIN: CPT | Mod: 25

## 2023-11-07 PROCEDURE — 46600 DIAGNOSTIC ANOSCOPY SPX: CPT

## 2023-11-07 RX ORDER — HYDROCORTISONE 25 MG/G
2.5 CREAM TOPICAL
Qty: 1 | Refills: 5 | Status: ACTIVE | COMMUNITY
Start: 2023-11-07 | End: 1900-01-01

## 2023-11-07 RX ORDER — FLUTICASONE FUROATE 100 UG/1
100 POWDER RESPIRATORY (INHALATION)
Qty: 90 | Refills: 0 | Status: DISCONTINUED | COMMUNITY
Start: 2021-03-16 | End: 2023-11-07

## 2023-12-07 ENCOUNTER — OUTPATIENT (OUTPATIENT)
Dept: OUTPATIENT SERVICES | Facility: HOSPITAL | Age: 73
LOS: 1 days | End: 2023-12-07

## 2023-12-07 VITALS
HEIGHT: 69.5 IN | OXYGEN SATURATION: 95 % | DIASTOLIC BLOOD PRESSURE: 71 MMHG | SYSTOLIC BLOOD PRESSURE: 110 MMHG | HEART RATE: 78 BPM | RESPIRATION RATE: 20 BRPM | WEIGHT: 250 LBS | TEMPERATURE: 98 F

## 2023-12-07 DIAGNOSIS — C61 MALIGNANT NEOPLASM OF PROSTATE: ICD-10-CM

## 2023-12-07 DIAGNOSIS — R06.83 SNORING: ICD-10-CM

## 2023-12-07 DIAGNOSIS — I10 ESSENTIAL (PRIMARY) HYPERTENSION: ICD-10-CM

## 2023-12-07 DIAGNOSIS — Z98.890 OTHER SPECIFIED POSTPROCEDURAL STATES: Chronic | ICD-10-CM

## 2023-12-07 DIAGNOSIS — Z87.09 PERSONAL HISTORY OF OTHER DISEASES OF THE RESPIRATORY SYSTEM: ICD-10-CM

## 2023-12-07 DIAGNOSIS — E11.9 TYPE 2 DIABETES MELLITUS WITHOUT COMPLICATIONS: ICD-10-CM

## 2023-12-07 LAB
A1C WITH ESTIMATED AVERAGE GLUCOSE RESULT: 6.6 % — HIGH (ref 4–5.6)
A1C WITH ESTIMATED AVERAGE GLUCOSE RESULT: 6.6 % — HIGH (ref 4–5.6)
ANION GAP SERPL CALC-SCNC: 11 MMOL/L — SIGNIFICANT CHANGE UP (ref 7–14)
ANION GAP SERPL CALC-SCNC: 11 MMOL/L — SIGNIFICANT CHANGE UP (ref 7–14)
BASOPHILS # BLD AUTO: 0.06 K/UL — SIGNIFICANT CHANGE UP (ref 0–0.2)
BASOPHILS # BLD AUTO: 0.06 K/UL — SIGNIFICANT CHANGE UP (ref 0–0.2)
BASOPHILS NFR BLD AUTO: 1 % — SIGNIFICANT CHANGE UP (ref 0–2)
BASOPHILS NFR BLD AUTO: 1 % — SIGNIFICANT CHANGE UP (ref 0–2)
BUN SERPL-MCNC: 10 MG/DL — SIGNIFICANT CHANGE UP (ref 7–23)
BUN SERPL-MCNC: 10 MG/DL — SIGNIFICANT CHANGE UP (ref 7–23)
CALCIUM SERPL-MCNC: 9.6 MG/DL — SIGNIFICANT CHANGE UP (ref 8.4–10.5)
CALCIUM SERPL-MCNC: 9.6 MG/DL — SIGNIFICANT CHANGE UP (ref 8.4–10.5)
CHLORIDE SERPL-SCNC: 100 MMOL/L — SIGNIFICANT CHANGE UP (ref 98–107)
CHLORIDE SERPL-SCNC: 100 MMOL/L — SIGNIFICANT CHANGE UP (ref 98–107)
CO2 SERPL-SCNC: 27 MMOL/L — SIGNIFICANT CHANGE UP (ref 22–31)
CO2 SERPL-SCNC: 27 MMOL/L — SIGNIFICANT CHANGE UP (ref 22–31)
CREAT SERPL-MCNC: 0.64 MG/DL — SIGNIFICANT CHANGE UP (ref 0.5–1.3)
CREAT SERPL-MCNC: 0.64 MG/DL — SIGNIFICANT CHANGE UP (ref 0.5–1.3)
EGFR: 100 ML/MIN/1.73M2 — SIGNIFICANT CHANGE UP
EGFR: 100 ML/MIN/1.73M2 — SIGNIFICANT CHANGE UP
EOSINOPHIL # BLD AUTO: 0.12 K/UL — SIGNIFICANT CHANGE UP (ref 0–0.5)
EOSINOPHIL # BLD AUTO: 0.12 K/UL — SIGNIFICANT CHANGE UP (ref 0–0.5)
EOSINOPHIL NFR BLD AUTO: 2 % — SIGNIFICANT CHANGE UP (ref 0–6)
EOSINOPHIL NFR BLD AUTO: 2 % — SIGNIFICANT CHANGE UP (ref 0–6)
ESTIMATED AVERAGE GLUCOSE: 143 — SIGNIFICANT CHANGE UP
ESTIMATED AVERAGE GLUCOSE: 143 — SIGNIFICANT CHANGE UP
GLUCOSE SERPL-MCNC: 162 MG/DL — HIGH (ref 70–99)
GLUCOSE SERPL-MCNC: 162 MG/DL — HIGH (ref 70–99)
HCT VFR BLD CALC: 38.9 % — LOW (ref 39–50)
HCT VFR BLD CALC: 38.9 % — LOW (ref 39–50)
HGB BLD-MCNC: 13.1 G/DL — SIGNIFICANT CHANGE UP (ref 13–17)
HGB BLD-MCNC: 13.1 G/DL — SIGNIFICANT CHANGE UP (ref 13–17)
IANC: 3.24 K/UL — SIGNIFICANT CHANGE UP (ref 1.8–7.4)
IANC: 3.24 K/UL — SIGNIFICANT CHANGE UP (ref 1.8–7.4)
IMM GRANULOCYTES NFR BLD AUTO: 0.3 % — SIGNIFICANT CHANGE UP (ref 0–0.9)
IMM GRANULOCYTES NFR BLD AUTO: 0.3 % — SIGNIFICANT CHANGE UP (ref 0–0.9)
LYMPHOCYTES # BLD AUTO: 2.19 K/UL — SIGNIFICANT CHANGE UP (ref 1–3.3)
LYMPHOCYTES # BLD AUTO: 2.19 K/UL — SIGNIFICANT CHANGE UP (ref 1–3.3)
LYMPHOCYTES # BLD AUTO: 35.8 % — SIGNIFICANT CHANGE UP (ref 13–44)
LYMPHOCYTES # BLD AUTO: 35.8 % — SIGNIFICANT CHANGE UP (ref 13–44)
MCHC RBC-ENTMCNC: 29.9 PG — SIGNIFICANT CHANGE UP (ref 27–34)
MCHC RBC-ENTMCNC: 29.9 PG — SIGNIFICANT CHANGE UP (ref 27–34)
MCHC RBC-ENTMCNC: 33.7 GM/DL — SIGNIFICANT CHANGE UP (ref 32–36)
MCHC RBC-ENTMCNC: 33.7 GM/DL — SIGNIFICANT CHANGE UP (ref 32–36)
MCV RBC AUTO: 88.8 FL — SIGNIFICANT CHANGE UP (ref 80–100)
MCV RBC AUTO: 88.8 FL — SIGNIFICANT CHANGE UP (ref 80–100)
MONOCYTES # BLD AUTO: 0.48 K/UL — SIGNIFICANT CHANGE UP (ref 0–0.9)
MONOCYTES # BLD AUTO: 0.48 K/UL — SIGNIFICANT CHANGE UP (ref 0–0.9)
MONOCYTES NFR BLD AUTO: 7.9 % — SIGNIFICANT CHANGE UP (ref 2–14)
MONOCYTES NFR BLD AUTO: 7.9 % — SIGNIFICANT CHANGE UP (ref 2–14)
NEUTROPHILS # BLD AUTO: 3.24 K/UL — SIGNIFICANT CHANGE UP (ref 1.8–7.4)
NEUTROPHILS # BLD AUTO: 3.24 K/UL — SIGNIFICANT CHANGE UP (ref 1.8–7.4)
NEUTROPHILS NFR BLD AUTO: 53 % — SIGNIFICANT CHANGE UP (ref 43–77)
NEUTROPHILS NFR BLD AUTO: 53 % — SIGNIFICANT CHANGE UP (ref 43–77)
NRBC # BLD: 0 /100 WBCS — SIGNIFICANT CHANGE UP (ref 0–0)
NRBC # BLD: 0 /100 WBCS — SIGNIFICANT CHANGE UP (ref 0–0)
NRBC # FLD: 0 K/UL — SIGNIFICANT CHANGE UP (ref 0–0)
NRBC # FLD: 0 K/UL — SIGNIFICANT CHANGE UP (ref 0–0)
PLATELET # BLD AUTO: 184 K/UL — SIGNIFICANT CHANGE UP (ref 150–400)
PLATELET # BLD AUTO: 184 K/UL — SIGNIFICANT CHANGE UP (ref 150–400)
POTASSIUM SERPL-MCNC: 3.7 MMOL/L — SIGNIFICANT CHANGE UP (ref 3.5–5.3)
POTASSIUM SERPL-MCNC: 3.7 MMOL/L — SIGNIFICANT CHANGE UP (ref 3.5–5.3)
POTASSIUM SERPL-SCNC: 3.7 MMOL/L — SIGNIFICANT CHANGE UP (ref 3.5–5.3)
POTASSIUM SERPL-SCNC: 3.7 MMOL/L — SIGNIFICANT CHANGE UP (ref 3.5–5.3)
RBC # BLD: 4.38 M/UL — SIGNIFICANT CHANGE UP (ref 4.2–5.8)
RBC # BLD: 4.38 M/UL — SIGNIFICANT CHANGE UP (ref 4.2–5.8)
RBC # FLD: 13.5 % — SIGNIFICANT CHANGE UP (ref 10.3–14.5)
RBC # FLD: 13.5 % — SIGNIFICANT CHANGE UP (ref 10.3–14.5)
SODIUM SERPL-SCNC: 138 MMOL/L — SIGNIFICANT CHANGE UP (ref 135–145)
SODIUM SERPL-SCNC: 138 MMOL/L — SIGNIFICANT CHANGE UP (ref 135–145)
WBC # BLD: 6.11 K/UL — SIGNIFICANT CHANGE UP (ref 3.8–10.5)
WBC # BLD: 6.11 K/UL — SIGNIFICANT CHANGE UP (ref 3.8–10.5)
WBC # FLD AUTO: 6.11 K/UL — SIGNIFICANT CHANGE UP (ref 3.8–10.5)
WBC # FLD AUTO: 6.11 K/UL — SIGNIFICANT CHANGE UP (ref 3.8–10.5)

## 2023-12-07 NOTE — H&P PST ADULT - NSICDXPASTMEDICALHX_GEN_ALL_CORE_FT
PAST MEDICAL HISTORY:  History of asthma     Hyperlipidemia     Hypertension     Type 2 diabetes mellitus

## 2023-12-07 NOTE — H&P PST ADULT - PROBLEM SELECTOR PLAN 1
scheduled for transperineal fusion guided focal cryoablation of the prostate with Dr. Carlson on 12/15/2023.   Verbal and written pre-op instructions provided to patient. Patient verbalized understanding and will call surgeons office for revised instructions if surgery is rescheduled.   Pepcid for GI prophylaxis provided.

## 2023-12-07 NOTE — H&P PST ADULT - LYMPHATIC
09/06/17 1300   Group 1   Start Time 0930   Stop Time 1015   Length (min) 45 Min   Group Name Check In   Focus of Group Symptoms and Goals   Attendance Present   Mood Other  (Appropriate to Content)   Affect Calm   Behavior/Socialization Cooperative   Thought Process Focused;[place] thinking   Patient Response Attentive;Follows direction;Quiet;Selective interactions   Task Performance Follows directions   Safety Concerns Other  (Patient scored zeros)   Degree Patient Ready for Discharge Yes   Group Notes Improved symptoms include less depression and anxiety. Symptoms still of concern are auditory hallucinations. Patient's goal for the day is \"to attend group\".      No lymphadedenopathy

## 2023-12-07 NOTE — H&P PST ADULT - HEIGHT IN FEET
5 Banner Transposition Flap Text: The defect edges were debeveled with a #15 scalpel blade.  Given the location of the defect and the proximity to free margins a Banner transposition flap was deemed most appropriate.  Using a sterile surgical marker, an appropriate flap drawn around the defect. The area thus outlined was incised deep to adipose tissue with a #15 scalpel blade.  The skin margins were undermined to an appropriate distance in all directions utilizing iris scissors.

## 2023-12-07 NOTE — H&P PST ADULT - ASSESSMENT
74 yo male presents to PST unit with pre-op diagnosis of prostate cancer scheduled for transperineal fusion guided focal cryoablation of the prostate with Dr. Carlson.  72 yo male presents to PST unit with pre-op diagnosis of prostate cancer scheduled for transperineal fusion guided focal cryoablation of the prostate with Dr. Carlson.

## 2023-12-07 NOTE — H&P PST ADULT - HISTORY OF PRESENT ILLNESS
72 yo male presents to PST unit with pre-op diagnosis of prostate cancer scheduled for transperineal fusion guided focal cryoablation of the prostate with Dr. Carlson.

## 2023-12-08 LAB
CULTURE RESULTS: NO GROWTH — SIGNIFICANT CHANGE UP
CULTURE RESULTS: NO GROWTH — SIGNIFICANT CHANGE UP
SPECIMEN SOURCE: SIGNIFICANT CHANGE UP
SPECIMEN SOURCE: SIGNIFICANT CHANGE UP

## 2023-12-10 ENCOUNTER — NON-APPOINTMENT (OUTPATIENT)
Age: 73
End: 2023-12-10

## 2023-12-11 ENCOUNTER — TRANSCRIPTION ENCOUNTER (OUTPATIENT)
Age: 73
End: 2023-12-11

## 2023-12-12 ENCOUNTER — TRANSCRIPTION ENCOUNTER (OUTPATIENT)
Age: 73
End: 2023-12-12

## 2023-12-12 ENCOUNTER — APPOINTMENT (OUTPATIENT)
Dept: COLORECTAL SURGERY | Facility: CLINIC | Age: 73
End: 2023-12-12
Payer: MEDICARE

## 2023-12-12 PROBLEM — Z87.09 PERSONAL HISTORY OF OTHER DISEASES OF THE RESPIRATORY SYSTEM: Chronic | Status: ACTIVE | Noted: 2023-12-07

## 2023-12-12 PROBLEM — I10 ESSENTIAL (PRIMARY) HYPERTENSION: Chronic | Status: ACTIVE | Noted: 2023-12-07

## 2023-12-12 PROBLEM — E78.5 HYPERLIPIDEMIA, UNSPECIFIED: Chronic | Status: ACTIVE | Noted: 2023-12-07

## 2023-12-12 PROBLEM — E11.9 TYPE 2 DIABETES MELLITUS WITHOUT COMPLICATIONS: Chronic | Status: ACTIVE | Noted: 2023-12-07

## 2023-12-12 PROCEDURE — 99213 OFFICE O/P EST LOW 20 MIN: CPT

## 2023-12-14 ENCOUNTER — TRANSCRIPTION ENCOUNTER (OUTPATIENT)
Age: 73
End: 2023-12-14

## 2023-12-15 ENCOUNTER — TRANSCRIPTION ENCOUNTER (OUTPATIENT)
Age: 73
End: 2023-12-15

## 2023-12-15 ENCOUNTER — APPOINTMENT (OUTPATIENT)
Dept: UROLOGY | Facility: AMBULATORY SURGERY CENTER | Age: 73
End: 2023-12-15

## 2023-12-15 ENCOUNTER — OUTPATIENT (OUTPATIENT)
Dept: OUTPATIENT SERVICES | Facility: HOSPITAL | Age: 73
LOS: 1 days | Discharge: HOME | End: 2023-12-15
Payer: MEDICARE

## 2023-12-15 VITALS
WEIGHT: 250 LBS | TEMPERATURE: 98 F | HEART RATE: 78 BPM | OXYGEN SATURATION: 95 % | DIASTOLIC BLOOD PRESSURE: 71 MMHG | HEIGHT: 69.5 IN | RESPIRATION RATE: 20 BRPM | SYSTOLIC BLOOD PRESSURE: 110 MMHG

## 2023-12-15 VITALS
HEART RATE: 79 BPM | RESPIRATION RATE: 14 BRPM | SYSTOLIC BLOOD PRESSURE: 115 MMHG | DIASTOLIC BLOOD PRESSURE: 67 MMHG | OXYGEN SATURATION: 99 % | TEMPERATURE: 98 F

## 2023-12-15 DIAGNOSIS — C61 MALIGNANT NEOPLASM OF PROSTATE: ICD-10-CM

## 2023-12-15 DIAGNOSIS — Z98.890 OTHER SPECIFIED POSTPROCEDURAL STATES: Chronic | ICD-10-CM

## 2023-12-15 LAB
GLUCOSE BLDC GLUCOMTR-MCNC: 143 MG/DL — HIGH (ref 70–99)
GLUCOSE BLDC GLUCOMTR-MCNC: 143 MG/DL — HIGH (ref 70–99)

## 2023-12-15 PROCEDURE — 76377 3D RENDER W/INTRP POSTPROCES: CPT | Mod: 26

## 2023-12-15 PROCEDURE — 55873 CRYOABLATE PROSTATE: CPT

## 2023-12-15 DEVICE — KIT VL PROSTATE 3 ICEPEARL 2.1 CX - FOR LIJ ONLY: Type: IMPLANTABLE DEVICE | Status: FUNCTIONAL

## 2023-12-15 DEVICE — GUIDEWIRE AMPLATZ SUPER-STIFF .038" X 145CM 3.5CM FLEXIBLE: Type: IMPLANTABLE DEVICE | Status: FUNCTIONAL

## 2023-12-15 RX ORDER — ERGOCALCIFEROL 1.25 MG/1
0 CAPSULE ORAL
Refills: 0 | DISCHARGE

## 2023-12-15 RX ORDER — LOSARTAN/HYDROCHLOROTHIAZIDE 100MG-25MG
1 TABLET ORAL
Refills: 0 | DISCHARGE

## 2023-12-15 RX ORDER — BECLOMETHASONE DIPROPIONATE 40 UG/1
1 AEROSOL, METERED RESPIRATORY (INHALATION)
Refills: 0 | DISCHARGE

## 2023-12-15 RX ORDER — METFORMIN HYDROCHLORIDE 850 MG/1
1 TABLET ORAL
Refills: 0 | DISCHARGE

## 2023-12-15 RX ORDER — ROSUVASTATIN CALCIUM 5 MG/1
1 TABLET ORAL
Refills: 0 | DISCHARGE

## 2023-12-15 RX ORDER — EZETIMIBE 10 MG/1
1 TABLET ORAL
Refills: 0 | DISCHARGE

## 2023-12-15 RX ORDER — ASPIRIN/CALCIUM CARB/MAGNESIUM 324 MG
1 TABLET ORAL
Refills: 0 | DISCHARGE

## 2023-12-15 NOTE — ASU DISCHARGE PLAN (ADULT/PEDIATRIC) - ASU DC SPECIAL INSTRUCTIONSFT
CATHETER: You are going home with a catheter. The nurses will review with you how to remove this yourself tomorrow.   PAIN CONTROL: You may take Tylenol (acetaminophen) 650-975mg and/or Motrin (ibuprofen) 400-600mg, both available over the counter, for pain every 6 hours as needed. Do not exceed 4000mg of Tylenol (acetaminophen) daily. You may alternate these medications such that you take one or the other every 3 hours for around the clock pain coverage. Do not exceed 4 grams of Tylenol daily.   WOUND CARE: You have gauze covering the biopsy site, you may change this if get saturated otherwise you may remove it in 24hours. .  BATHING: Please do not submerge wound underwater for 72hours. You may shower. Please do not bathe while the catheter is in place.   ACTIVITY: No heavy lifting or straining for 5 days. Otherwise, you may return to your usual level of physical activity.   DIET: Return to your usual diet.  NOTIFY YOUR SURGEON IF: You have any bleeding that does not stop, any pus draining from your wound, any fever (over 100.4 F), inability to urinate, or chills, persistent nausea/vomiting, persistent diarrhea, or if your pain is not controlled on your discharge pain medications.  FOLLOW-UP: Please follow-up with Dr. Carlson in 6 weeks. You may remove your catheter tomorrow.

## 2023-12-15 NOTE — ASU DISCHARGE PLAN (ADULT/PEDIATRIC) - NS MD DC FALL RISK RISK
For information on Fall & Injury Prevention, visit: [email or web address]-[email or web address] OR  [email or web address]-[email or web address]-injury OR  https://www.cdc.gov/steadi/patient.html For information on Fall & Injury Prevention, visit: [email or web address]://[email or web address].[email or web address]/[email or web address]-[email or web address]-mobility OR  https://www.[email or web address]/[email or web address]-prevention-tips-to-avoid-injury OR  https://www.cdc.gov/steadi/patient.html

## 2023-12-15 NOTE — ASU PREOPERATIVE ASSESSMENT, ADULT (IPARK ONLY) - FALL HARM RISK - UNIVERSAL INTERVENTIONS
Bed in lowest position, wheels locked, appropriate side rails in place/Call bell, personal items and telephone in reach/Instruct patient to call for assistance before getting out of bed or chair/Non-slip footwear when patient is out of bed/[place] to call system/Physically safe environment - no spills, clutter or unnecessary equipment/Purposeful Proactive Rounding/Room/bathroom lighting operational, light cord in reach Bed in lowest position, wheels locked, appropriate side rails in place/Call bell, personal items and telephone in reach/Instruct patient to call for assistance before getting out of bed or chair/Non-slip footwear when patient is out of bed/[place] to call system/Physically safe environment - no spills, clutter or unnecessary equipment/Purposeful Proactive Rounding/Room/bathroom lighting operational, light cord in reach

## 2023-12-15 NOTE — ASU PREOP CHECKLIST - VERIFY SURGICAL SITE/SIDE WITH PATIENT
VSS, voiding and stooling.  Head is molded.  Working on feedings, skin to skin enc.  Enc to call for latch checks, needs, questions and concerns.    prostate/done

## 2023-12-15 NOTE — ASU DISCHARGE PLAN (ADULT/PEDIATRIC) - FOLLOW UP APPOINTMENTS
[facility] ([facility]) [facility] ([facility])

## 2023-12-15 NOTE — ASU PREOP CHECKLIST - ALLERGIES REVIEWED
[de-identified] : 06/22/22 Delayed B/L Breast KYUNG Flap Reconstruction with Dr. Lerman on 6/22/22 at [facility].\par \par 06/22/22 Delayed B/L Breast KYUNG Flap Reconstruction done

## 2023-12-15 NOTE — ASU DISCHARGE PLAN (ADULT/PEDIATRIC) - CARE PROVIDER_API CALL
Tadeo Carlson)  Urology  [address] [place] [address], [address] [address]  Phone: [phone]  Fax: [phone]  Follow Up Time:    Tadeo Carlson)  Urology  [address] [place] [address], [address] [address]  Phone: [phone]  Fax: [phone]  Follow Up Time:

## 2023-12-18 ENCOUNTER — TRANSCRIPTION ENCOUNTER (OUTPATIENT)
Age: 73
End: 2023-12-18

## 2024-01-30 ENCOUNTER — APPOINTMENT (OUTPATIENT)
Dept: UROLOGY | Facility: CLINIC | Age: 74
End: 2024-01-30
Payer: MEDICARE

## 2024-01-30 ENCOUNTER — APPOINTMENT (OUTPATIENT)
Dept: COLORECTAL SURGERY | Facility: CLINIC | Age: 74
End: 2024-01-30
Payer: MEDICARE

## 2024-01-30 VITALS
RESPIRATION RATE: 16 BRPM | SYSTOLIC BLOOD PRESSURE: 119 MMHG | BODY MASS INDEX: 35.07 KG/M2 | DIASTOLIC BLOOD PRESSURE: 77 MMHG | HEIGHT: 70 IN | HEART RATE: 76 BPM | WEIGHT: 245 LBS

## 2024-01-30 DIAGNOSIS — K64.8 OTHER HEMORRHOIDS: ICD-10-CM

## 2024-01-30 DIAGNOSIS — K64.4 RESIDUAL HEMORRHOIDAL SKIN TAGS: ICD-10-CM

## 2024-01-30 LAB
PSA FREE FLD-MCNC: 8 %
PSA FREE SERPL-MCNC: 0.96 NG/ML
PSA SERPL-MCNC: 12.2 NG/ML

## 2024-01-30 PROCEDURE — 99024 POSTOP FOLLOW-UP VISIT: CPT

## 2024-01-30 PROCEDURE — 46600 DIAGNOSTIC ANOSCOPY SPX: CPT

## 2024-01-30 PROCEDURE — 99213 OFFICE O/P EST LOW 20 MIN: CPT | Mod: 25

## 2024-01-30 NOTE — ASSESSMENT
[FreeTextEntry1] : Large external hemorrhoids with internal hemorrhoids -Hemorrhoidal disease currently asymptomatic -I recommended observation at this time -Continue fiber supplementation -Follow up in office if external disease becomes symptomatic

## 2024-01-30 NOTE — PHYSICAL EXAM
[FreeTextEntry1] : Alert and oriented x3 Moves all extremities no edema Abdomen soft nontender External anal exam skin tags circumferentially moderate to large soft nontender Digital rectal exam normal tone nontender Anoscopy-procedure performed in standard fashion under direct vision with an adult scope. Patient tolerated without event or complication -Moderate internal hemorrhoids no proctitis

## 2024-01-30 NOTE — HISTORY OF PRESENT ILLNESS
[FreeTextEntry1] : 73-year-old male presents for followup and external hemorrhoid disease. He reports significant improvement since last visit. Persistent external lesions noted. Denies bleeding. Denies pain no fevers or chills no nausea or vomiting no aggravating factors. Patient is scheduled for cryoablation of prostate this week.  January 30, 2024-patient progressing well. Tolerating diet. No fevers or chills no nausea or vomiting. Denies perianal pain since last visit. He reports palpable skin which is asymptomatic. Denies blood per rectum normal bowel movement significantly improved with fiber supplementation no aggravating factors

## 2024-01-31 NOTE — HISTORY OF PRESENT ILLNESS
[FreeTextEntry1] : Jakeli Debehar returns to the office today.  He is 73 years old and status post a recent focal cryoablation of the prostate performed in December of this year.  He is back today for his first posttreatment reassessment.  He reports that his stream is fair. Denies any increased urgency or frequency. Feels that erectile function has not been affected.  He denies any residual hematuria.  He did have some mild dysuria for about a week after the procedure but that has resolved.  No postoperative fevers or chills.  He has no residual perineal or abdominal discomfort.

## 2024-01-31 NOTE — DISEASE MANAGEMENT
Radha Dash   (NELLA:1[birth year]) [address] [facility],[address]  [address] [place] [address].  [phone]   [phone]           PHYSICIAN Communication and discontinuation summary  REFERRING PHYSICIAN: Adithya East MD  Return Physician Appointment: TBD  MEDICAL/REFERRING DIAGNOSIS:  · Left shoulder pain [M25.512]  ATTENDANCE: Radha Dash has attended 7 out of 8 visits, with 1 cancellation(s) and 0 no shows. ASSESSMENT:  DATE: 11/9/2017    PROGRESS: Radha Dash made progress with PT, was pain-free, had full AROM, and was independent with HEP. He had progressed with good functional strength and 5/5 with all UE mmt. He was able to perform weight bearing exercise without pain. RECOMMENDATIONS: Discharge to independent with HEP. Recommend patient refer to surgeon with regards to return to full sport without restriction.      Thank you for this referral,  Cullen Nair, PT     Referring Physician Signature: Adithya East MD          Date [1] : T1 [c] : c [0-10] : 0 -10 ng/mL [Biopsy with Fusion] : Patient had a biopsy with fusion on [6] : Template Biopsy Maryjo Score: 6 [7(3+4)] : Fusion Biopsy Lynn Score: 7(3+4) [Active Surveillance] : Active Surveillance [TotalCores] : 18 [TotalPositiveCores] : 3 [MaxCoreInvolvement] : 30 [IIB] : IIB

## 2024-02-01 ENCOUNTER — TRANSCRIPTION ENCOUNTER (OUTPATIENT)
Age: 74
End: 2024-02-01

## 2024-02-07 ENCOUNTER — TRANSCRIPTION ENCOUNTER (OUTPATIENT)
Age: 74
End: 2024-02-07

## 2024-04-17 LAB
PSA FREE FLD-MCNC: 8 %
PSA FREE SERPL-MCNC: 0.94 NG/ML
PSA SERPL-MCNC: 12.1 NG/ML

## 2024-04-18 ENCOUNTER — TRANSCRIPTION ENCOUNTER (OUTPATIENT)
Age: 74
End: 2024-04-18

## 2024-04-27 ENCOUNTER — APPOINTMENT (OUTPATIENT)
Dept: MRI IMAGING | Facility: IMAGING CENTER | Age: 74
End: 2024-04-27
Payer: MEDICARE

## 2024-04-27 ENCOUNTER — OUTPATIENT (OUTPATIENT)
Dept: OUTPATIENT SERVICES | Facility: HOSPITAL | Age: 74
LOS: 1 days | End: 2024-04-27
Payer: MEDICARE

## 2024-04-27 ENCOUNTER — RESULT REVIEW (OUTPATIENT)
Age: 74
End: 2024-04-27

## 2024-04-27 DIAGNOSIS — C61 MALIGNANT NEOPLASM OF PROSTATE: ICD-10-CM

## 2024-04-27 DIAGNOSIS — Z98.890 OTHER SPECIFIED POSTPROCEDURAL STATES: Chronic | ICD-10-CM

## 2024-04-27 PROCEDURE — 72197 MRI PELVIS W/O & W/DYE: CPT | Mod: 26,MH

## 2024-04-27 PROCEDURE — 76498 UNLISTED MR PROCEDURE: CPT

## 2024-04-27 PROCEDURE — 72197 MRI PELVIS W/O & W/DYE: CPT

## 2024-04-27 PROCEDURE — 76498P: CUSTOM | Mod: 26,MH

## 2024-04-27 PROCEDURE — A9585: CPT

## 2024-05-06 ENCOUNTER — TRANSCRIPTION ENCOUNTER (OUTPATIENT)
Age: 74
End: 2024-05-06

## 2024-05-06 ENCOUNTER — NON-APPOINTMENT (OUTPATIENT)
Age: 74
End: 2024-05-06

## 2024-05-10 ENCOUNTER — NON-APPOINTMENT (OUTPATIENT)
Age: 74
End: 2024-05-10

## 2024-05-11 ENCOUNTER — OUTPATIENT (OUTPATIENT)
Dept: OUTPATIENT SERVICES | Facility: HOSPITAL | Age: 74
LOS: 1 days | End: 2024-05-11
Payer: MEDICARE

## 2024-05-11 DIAGNOSIS — Z98.890 OTHER SPECIFIED POSTPROCEDURAL STATES: Chronic | ICD-10-CM

## 2024-05-11 DIAGNOSIS — Z00.8 ENCOUNTER FOR OTHER GENERAL EXAMINATION: ICD-10-CM

## 2024-05-14 ENCOUNTER — TRANSCRIPTION ENCOUNTER (OUTPATIENT)
Age: 74
End: 2024-05-14

## 2024-05-14 PROCEDURE — C8001: CPT

## 2024-05-16 ENCOUNTER — APPOINTMENT (OUTPATIENT)
Dept: UROLOGY | Facility: CLINIC | Age: 74
End: 2024-05-16

## 2024-05-20 ENCOUNTER — TRANSCRIPTION ENCOUNTER (OUTPATIENT)
Age: 74
End: 2024-05-20

## 2024-05-20 ENCOUNTER — NON-APPOINTMENT (OUTPATIENT)
Age: 74
End: 2024-05-20

## 2024-05-22 ENCOUNTER — TRANSCRIPTION ENCOUNTER (OUTPATIENT)
Age: 74
End: 2024-05-22

## 2024-05-23 ENCOUNTER — APPOINTMENT (OUTPATIENT)
Dept: UROLOGY | Facility: CLINIC | Age: 74
End: 2024-05-23

## 2024-05-23 ENCOUNTER — APPOINTMENT (OUTPATIENT)
Dept: UROLOGY | Facility: CLINIC | Age: 74
End: 2024-05-23
Payer: MEDICARE

## 2024-05-23 ENCOUNTER — OUTPATIENT (OUTPATIENT)
Dept: OUTPATIENT SERVICES | Facility: HOSPITAL | Age: 74
LOS: 1 days | End: 2024-05-23
Payer: MEDICARE

## 2024-05-23 DIAGNOSIS — Z98.890 OTHER SPECIFIED POSTPROCEDURAL STATES: Chronic | ICD-10-CM

## 2024-05-23 DIAGNOSIS — R35.0 FREQUENCY OF MICTURITION: ICD-10-CM

## 2024-05-23 PROCEDURE — 76999F: CUSTOM | Mod: 26

## 2024-05-23 PROCEDURE — 55700: CPT

## 2024-05-24 DIAGNOSIS — R93.5 ABNORMAL FINDINGS ON DIAGNOSTIC IMAGING OF OTHER ABDOMINAL REGIONS, INCLUDING RETROPERITONEUM: ICD-10-CM

## 2024-05-24 DIAGNOSIS — C61 MALIGNANT NEOPLASM OF PROSTATE: ICD-10-CM

## 2024-05-30 ENCOUNTER — APPOINTMENT (OUTPATIENT)
Dept: UROLOGY | Facility: CLINIC | Age: 74
End: 2024-05-30
Payer: MEDICARE

## 2024-05-30 DIAGNOSIS — R93.5 ABNORMAL FINDINGS ON DIAGNOSTIC IMAGING OF OTHER ABDOMINAL REGIONS, INCLUDING RETROPERITONEUM: ICD-10-CM

## 2024-05-30 DIAGNOSIS — C61 MALIGNANT NEOPLASM OF PROSTATE: ICD-10-CM

## 2024-05-30 DIAGNOSIS — N52.9 MALE ERECTILE DYSFUNCTION, UNSPECIFIED: ICD-10-CM

## 2024-05-30 PROCEDURE — G2211 COMPLEX E/M VISIT ADD ON: CPT

## 2024-05-30 PROCEDURE — 99214 OFFICE O/P EST MOD 30 MIN: CPT

## 2024-05-30 RX ORDER — TADALAFIL 20 MG/1
20 TABLET ORAL
Qty: 10 | Refills: 6 | Status: ACTIVE | COMMUNITY
Start: 2021-02-12 | End: 1900-01-01

## 2024-06-01 PROBLEM — C61 PROSTATE CANCER: Status: ACTIVE | Noted: 2023-05-29

## 2024-06-01 PROBLEM — R93.5 ABNORMAL MRI, PELVIS: Status: ACTIVE | Noted: 2021-05-27

## 2024-06-01 PROBLEM — N52.9 ED (ERECTILE DYSFUNCTION): Status: ACTIVE | Noted: 2023-01-24

## 2024-06-01 LAB — PROSTATE BIOPSY: NORMAL

## 2024-06-01 NOTE — LETTER GREETING
[Dear  ___] : Dear  [unfilled], [Follow-Up] : Your patient, [unfilled] was seen in my office today for follow-up [Please see my note below.] : Please see my note below. [FreeTextEntry2] : Jas Longoria MD [address] [place] [address]

## 2024-06-01 NOTE — HISTORY OF PRESENT ILLNESS
[FreeTextEntry1] : Jakeli Debehar returns to the office today.  He is 73 years old and has undergone a focal cryoablation of the prostate.  He recently had repeat MRI imaging and underwent follow-up biopsy.  He is here with me today to review the results.  The recently performed MRI had shown that the prior ablation zone did not appear to be suspicious but he did have a new lesion on the contralateral side of the prostate.  Biopsies of this area did detect small volume Maryjo 3+4 disease.  All of the template biopsies were negative for malignancy.  The patient reports no significant changes in urination after his recent biopsy.  He did have some changes in urination after the cryoablation however with more frequency and urgency from his perspective.  He also has baseline erectile dysfunction and does use tadalafil which he says sometimes is quite helpful.  He had pre-existing erectile dysfunction and had used medication before the cryoablation therapy as well.
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
## 2024-06-01 NOTE — LETTER CLOSING
[FreeTextEntry3] : Sincerely,      Tadeo Carlson MD, FACS Director of Urology Services, [facility] Chief of Urology, [facility]  of Urology   [facility], [place] [id number] P: [phone] F: [phone] [facility]

## 2024-06-03 ENCOUNTER — TRANSCRIPTION ENCOUNTER (OUTPATIENT)
Age: 74
End: 2024-06-03

## 2024-06-04 ENCOUNTER — TRANSCRIPTION ENCOUNTER (OUTPATIENT)
Age: 74
End: 2024-06-04

## 2024-06-06 ENCOUNTER — APPOINTMENT (OUTPATIENT)
Dept: UROLOGY | Facility: CLINIC | Age: 74
End: 2024-06-06

## 2024-07-01 ENCOUNTER — APPOINTMENT (OUTPATIENT)
Dept: UROLOGY | Facility: CLINIC | Age: 74
End: 2024-07-01

## 2024-07-16 ENCOUNTER — APPOINTMENT (OUTPATIENT)
Dept: UROLOGY | Facility: CLINIC | Age: 74
End: 2024-07-16

## 2024-08-20 ENCOUNTER — APPOINTMENT (OUTPATIENT)
Dept: UROLOGY | Facility: IMAGING CENTER | Age: 74
End: 2024-08-20

## 2024-08-31 ENCOUNTER — LABORATORY RESULT (OUTPATIENT)
Age: 74
End: 2024-08-31

## 2024-10-29 ENCOUNTER — NON-APPOINTMENT (OUTPATIENT)
Age: 74
End: 2024-10-29

## 2024-10-29 ENCOUNTER — APPOINTMENT (OUTPATIENT)
Dept: UROLOGY | Facility: CLINIC | Age: 74
End: 2024-10-29
Payer: MEDICARE

## 2024-10-29 VITALS
BODY MASS INDEX: 34.79 KG/M2 | WEIGHT: 243 LBS | OXYGEN SATURATION: 94 % | SYSTOLIC BLOOD PRESSURE: 132 MMHG | DIASTOLIC BLOOD PRESSURE: 83 MMHG | HEART RATE: 78 BPM | HEIGHT: 70 IN

## 2024-10-29 DIAGNOSIS — C61 MALIGNANT NEOPLASM OF PROSTATE: ICD-10-CM

## 2024-10-29 PROCEDURE — G2211 COMPLEX E/M VISIT ADD ON: CPT

## 2024-10-29 PROCEDURE — 99214 OFFICE O/P EST MOD 30 MIN: CPT

## 2024-11-16 ENCOUNTER — OUTPATIENT (OUTPATIENT)
Dept: OUTPATIENT SERVICES | Facility: HOSPITAL | Age: 74
LOS: 1 days | End: 2024-11-16
Payer: MEDICARE

## 2024-11-16 ENCOUNTER — APPOINTMENT (OUTPATIENT)
Dept: NUCLEAR MEDICINE | Facility: IMAGING CENTER | Age: 74
End: 2024-11-16
Payer: MEDICARE

## 2024-11-16 DIAGNOSIS — Z98.890 OTHER SPECIFIED POSTPROCEDURAL STATES: Chronic | ICD-10-CM

## 2024-11-16 DIAGNOSIS — C61 MALIGNANT NEOPLASM OF PROSTATE: ICD-10-CM

## 2024-11-16 PROCEDURE — 78816 PET IMAGE W/CT FULL BODY: CPT | Mod: 26,KX,MH

## 2024-11-16 PROCEDURE — 78816 PET IMAGE W/CT FULL BODY: CPT

## 2024-11-16 PROCEDURE — A9595: CPT

## 2024-11-19 ENCOUNTER — NON-APPOINTMENT (OUTPATIENT)
Age: 74
End: 2024-11-19

## 2024-11-25 ENCOUNTER — TRANSCRIPTION ENCOUNTER (OUTPATIENT)
Age: 74
End: 2024-11-25

## 2024-12-02 ENCOUNTER — TRANSCRIPTION ENCOUNTER (OUTPATIENT)
Age: 74
End: 2024-12-02

## 2025-01-22 ENCOUNTER — OUTPATIENT (OUTPATIENT)
Dept: OUTPATIENT SERVICES | Facility: HOSPITAL | Age: 75
LOS: 1 days | End: 2025-01-22

## 2025-01-22 ENCOUNTER — NON-APPOINTMENT (OUTPATIENT)
Age: 75
End: 2025-01-22

## 2025-01-22 VITALS
DIASTOLIC BLOOD PRESSURE: 77 MMHG | WEIGHT: 244.05 LBS | TEMPERATURE: 97 F | OXYGEN SATURATION: 97 % | SYSTOLIC BLOOD PRESSURE: 127 MMHG | RESPIRATION RATE: 16 BRPM | HEIGHT: 70 IN | HEART RATE: 84 BPM

## 2025-01-22 DIAGNOSIS — J45.909 UNSPECIFIED ASTHMA, UNCOMPLICATED: ICD-10-CM

## 2025-01-22 DIAGNOSIS — C61 MALIGNANT NEOPLASM OF PROSTATE: ICD-10-CM

## 2025-01-22 DIAGNOSIS — C61 MALIGNANT NEOPLASM OF PROSTATE: Chronic | ICD-10-CM

## 2025-01-22 DIAGNOSIS — E11.9 TYPE 2 DIABETES MELLITUS WITHOUT COMPLICATIONS: ICD-10-CM

## 2025-01-22 DIAGNOSIS — I10 ESSENTIAL (PRIMARY) HYPERTENSION: ICD-10-CM

## 2025-01-22 DIAGNOSIS — Z91.89 OTHER SPECIFIED PERSONAL RISK FACTORS, NOT ELSEWHERE CLASSIFIED: ICD-10-CM

## 2025-01-22 DIAGNOSIS — Z98.890 OTHER SPECIFIED POSTPROCEDURAL STATES: Chronic | ICD-10-CM

## 2025-01-22 LAB
A1C WITH ESTIMATED AVERAGE GLUCOSE RESULT: 7 % — HIGH (ref 4–5.6)
ANION GAP SERPL CALC-SCNC: 15 MMOL/L — HIGH (ref 7–14)
BUN SERPL-MCNC: 12 MG/DL — SIGNIFICANT CHANGE UP (ref 7–23)
CALCIUM SERPL-MCNC: 9.3 MG/DL — SIGNIFICANT CHANGE UP (ref 8.4–10.5)
CHLORIDE SERPL-SCNC: 100 MMOL/L — SIGNIFICANT CHANGE UP (ref 98–107)
CO2 SERPL-SCNC: 24 MMOL/L — SIGNIFICANT CHANGE UP (ref 22–31)
CREAT SERPL-MCNC: 0.68 MG/DL — SIGNIFICANT CHANGE UP (ref 0.5–1.3)
EGFR: 98 ML/MIN/1.73M2 — SIGNIFICANT CHANGE UP
ESTIMATED AVERAGE GLUCOSE: 154 — SIGNIFICANT CHANGE UP
GLUCOSE SERPL-MCNC: 214 MG/DL — HIGH (ref 70–99)
POTASSIUM SERPL-MCNC: 3.5 MMOL/L — SIGNIFICANT CHANGE UP (ref 3.5–5.3)
POTASSIUM SERPL-SCNC: 3.5 MMOL/L — SIGNIFICANT CHANGE UP (ref 3.5–5.3)
SODIUM SERPL-SCNC: 139 MMOL/L — SIGNIFICANT CHANGE UP (ref 135–145)

## 2025-01-22 RX ORDER — SODIUM CHLORIDE 9 G/ML
1000 INJECTION, SOLUTION INTRAVENOUS
Refills: 0 | Status: DISCONTINUED | OUTPATIENT
Start: 2025-01-22 | End: 2025-02-05

## 2025-01-22 NOTE — H&P PST ADULT - NSICDXPASTMEDICALHX_GEN_ALL_CORE_FT
PAST MEDICAL HISTORY:  History of asthma     Hyperlipidemia     Hypertension     Type 2 diabetes mellitus      PAST MEDICAL HISTORY:  History of asthma     Hyperlipidemia     Hypertension     Malignant neoplasm of prostate     Obesity     Type 2 diabetes mellitus

## 2025-01-22 NOTE — H&P PST ADULT - PROBLEM SELECTOR PLAN 1
Scheduled for focal cryoablation of the prostate   Written & verbal preop instructions, gi prophylaxis & surgical soap given  Pt verbalized good understanding.  Teach back done on surgical soap instructions.

## 2025-01-22 NOTE — H&P PST ADULT - HISTORY OF PRESENT ILLNESS
72 yo male presents to PST unit with pre-op diagnosis of prostate cancer scheduled for transperineal fusion guided focal cryoablation of the prostate with Dr. Carlson.  75 yo male presents for preop eval.  Pt with pre-op diagnosis of prostate cancer scheduled for focal cryoablation of the prostate.  H/o  transperineal fusion guided focal cryoablation of the prostate with Dr. Carlson in December 2023.  Pt reports recent surveillance imaging show "new lesion".

## 2025-01-22 NOTE — H&P PST ADULT - GENERAL
Patient called back and was given the information for her PT/INR      Medication Pending
negative
denies pain/discomfort (Rating = 0)

## 2025-01-22 NOTE — H&P PST ADULT - NSICDXPASTSURGICALHX_GEN_ALL_CORE_FT
PAST SURGICAL HISTORY:  Status post meniscectomy      PAST SURGICAL HISTORY:  H/O prior ablation treatment     Malignant neoplasm of prostate     Status post meniscectomy

## 2025-01-22 NOTE — H&P PST ADULT - ENDOCRINE COMMENTS
History of diabetes, last A1c 6.6 type II DM random home FS monitoring done. states A1C = 7  in November 2024

## 2025-01-22 NOTE — H&P PST ADULT - CARDIOVASCULAR
details… regular rate and rhythm/S1 S2 present/normal PMI trace b/l ankle edema/regular rate and rhythm/S1 S2 present/normal PMI/peripheral edema

## 2025-01-22 NOTE — H&P PST ADULT - PRIMARY CARE PROVIDER
[FreeTextEntry1] : Hypothyroidism/Hashimotos in pregnancy\par -Increase LT4 to 100 mcg daily with extra 1/2 tab one day a week. Patient advised to take every day in the morning, on an empty stomach, and with no other medications. \par -Pt aware goal TSH is 2.5 or less\par -Repeat TFTs q4 weeks\par -Needs a plan for post partum as she was not on LT4 going into pregnancy\par \par RTO before cris in early Oct  Jas Drew [phone]

## 2025-01-23 LAB
CULTURE RESULTS: NO GROWTH — SIGNIFICANT CHANGE UP
SPECIMEN SOURCE: SIGNIFICANT CHANGE UP

## 2025-01-28 ENCOUNTER — APPOINTMENT (OUTPATIENT)
Dept: UROLOGY | Facility: AMBULATORY SURGERY CENTER | Age: 75
End: 2025-01-28

## 2025-01-28 ENCOUNTER — TRANSCRIPTION ENCOUNTER (OUTPATIENT)
Age: 75
End: 2025-01-28

## 2025-01-28 ENCOUNTER — OUTPATIENT (OUTPATIENT)
Dept: OUTPATIENT SERVICES | Facility: HOSPITAL | Age: 75
LOS: 1 days | Discharge: HOME | End: 2025-01-28
Payer: MEDICARE

## 2025-01-28 VITALS
SYSTOLIC BLOOD PRESSURE: 128 MMHG | HEIGHT: 70 IN | WEIGHT: 244.71 LBS | TEMPERATURE: 98 F | OXYGEN SATURATION: 96 % | RESPIRATION RATE: 20 BRPM | HEART RATE: 73 BPM | DIASTOLIC BLOOD PRESSURE: 78 MMHG

## 2025-01-28 VITALS
OXYGEN SATURATION: 100 % | RESPIRATION RATE: 16 BRPM | DIASTOLIC BLOOD PRESSURE: 73 MMHG | TEMPERATURE: 98 F | HEART RATE: 72 BPM | SYSTOLIC BLOOD PRESSURE: 127 MMHG

## 2025-01-28 DIAGNOSIS — Z98.890 OTHER SPECIFIED POSTPROCEDURAL STATES: Chronic | ICD-10-CM

## 2025-01-28 DIAGNOSIS — C61 MALIGNANT NEOPLASM OF PROSTATE: Chronic | ICD-10-CM

## 2025-01-28 DIAGNOSIS — C61 MALIGNANT NEOPLASM OF PROSTATE: ICD-10-CM

## 2025-01-28 LAB — GLUCOSE BLDC GLUCOMTR-MCNC: 156 MG/DL — HIGH (ref 70–99)

## 2025-01-28 PROCEDURE — 76999F: CUSTOM | Mod: 26

## 2025-01-28 PROCEDURE — 55873 CRYOABLATE PROSTATE: CPT

## 2025-01-28 DEVICE — GUIDEWIRE AMPLATZ SUPER-STIFF .038" X 145CM 3.5CM FLEXIBLE: Type: IMPLANTABLE DEVICE | Status: FUNCTIONAL

## 2025-01-28 DEVICE — KIT VL PROSTATE 3 ICEPEARL 2.1 CX - FOR LIJ ONLY: Type: IMPLANTABLE DEVICE | Status: FUNCTIONAL

## 2025-01-28 RX ORDER — ROSUVASTATIN CALCIUM 10 MG/1
1 TABLET, FILM COATED ORAL
Refills: 0 | DISCHARGE

## 2025-01-28 RX ORDER — EZETIMIBE 10 MG
1 TABLET ORAL
Refills: 0 | DISCHARGE

## 2025-01-28 RX ORDER — ASPIRIN 81 MG/1
1 TABLET, COATED ORAL
Refills: 0 | DISCHARGE

## 2025-01-28 RX ORDER — METFORMIN HYDROCHLORIDE 1000 MG/1
1 TABLET, COATED ORAL
Refills: 0 | DISCHARGE

## 2025-01-28 RX ORDER — BECLOMETHASONE DIPROPIONATE 40 MCG
1 AEROSOL WITH ADAPTER (GRAM) INHALATION
Refills: 0 | DISCHARGE

## 2025-01-28 RX ORDER — CIPROFLOXACIN HCL 500 MG
1 TABLET ORAL
Qty: 6 | Refills: 0
Start: 2025-01-28 | End: 2025-01-30

## 2025-01-28 RX ORDER — LOSARTAN POTASSIUM 100 MG
1 TABLET ORAL
Refills: 0 | DISCHARGE

## 2025-01-28 NOTE — ASU DISCHARGE PLAN (ADULT/PEDIATRIC) - FINANCIAL ASSISTANCE
[facility] provides services at a reduced cost to those who are determined to be eligible through [facility]’s financial assistance program. Information regarding [facility]’s financial assistance program can be found by going to [email or web address] or by calling [phone].

## 2025-01-28 NOTE — BRIEF OPERATIVE NOTE - OPERATION/FINDINGS
Addended by: KUSH MAC on: 10/15/2020 03:17 PM     Modules accepted: Orders     uncomplicated focal cryoablation of prostate

## 2025-01-28 NOTE — ASU DISCHARGE PLAN (ADULT/PEDIATRIC) - CARE PROVIDER_API CALL
Tadeo Carlson)  Urology  [address] [place] [address], [address] [address]  Phone: [phone]  Fax: [phone]  Follow Up Time:

## 2025-01-28 NOTE — ASU DISCHARGE PLAN (ADULT/PEDIATRIC) - ASU DC SPECIAL INSTRUCTIONSFT
Harrison Catheter  Purpose: The catheter which you have was inserted in order to drain the urine from your bladder.  There is a small balloon at the tip of the catheter which prevents it from falling out. Remove the harrison catheter tomorrow, you may do this by deflating the balloon first or cutting the catheter so that the fluid drains out of the balloon.     Drainage Bags:  Leg bags – The tubing connected to your catheter should be secured to your thigh at all times.  You should always make sure that the tubing is not kinked or pinched off.  At the bottom of the bag, the is a small cap which should be closed while it is “in use.”  Whenever your bag is nearly full, you can empty the urine from the bag by simply removing this cap near a toilet, and allowing the urine to spill into the bowl.  Replace the cap when the bag is empty.  	  At night time, you can change the bag to the larger “night bag.”  This is simply done by  your catheter from the tubing (pulls apart easily) and plugging your catheter into the tubing that is attached to the larger bag.  This larger bag will take longer to fill up, and you will therefore not need to empty it during the night.        If at any time you notice that no urine has drained for more than one hour, make sure that you are drinking adequate liquids.  If this persists despite increases in your liquid intake, call your urologist.    Bag care – Your drainage bags can be washed with soap and water, rinsed with a solution of one part vinegar and one part water, and allowed to dry.  Do not use hot water, as this causes plastic to retain odors.    BATHING: You may shower or bathe.    DIET: You may resume your regular diet and regular medication regimen.    PAIN: You may take Tylenol (acetaminophen) 650-975mg and/or Motrin (ibuprofen) 400-600mg, both available over the counter, for pain every 6 hours as needed. Do not exceed 4000mg of Tylenol (acetaminophen) daily. You may alternate these medications such that you take one or the other every 3 hours for around the clock pain coverage.    ANTIBIOTICS: You may be given a prescription for an antibiotic, please take this medication as instructed and be sure to complete the entire course.    STOOL SOFTENERS: Do not allow yourself to become constipated as straining may cause bleeding. Take stool softeners or a laxative (ex. Miralax, Colace, Senokot, ExLax, etc), available over the counter, if needed.    ACTIVITY: No heavy lifting or strenuous exercise until you are evaluated at your post-operative appointment. Otherwise, you may return to your usual level of physical activity.    FOLLOW-UP: If you did not already schedule your post-operative appointment, please call your urologist to schedule and follow-up appointment.    CALL YOUR UROLOGIST IF: You have any bleeding that does not stop, inability to void >8 hours, fever over 100.4 F, chills, persistent nausea/vomiting, changes in your incision concerning for infection, or if your pain is not controlled on your discharge pain medications.

## 2025-01-28 NOTE — ASU PREOP CHECKLIST - LOOSE TEETH
Telephone Encounter by Kasey Jones RN at 4/1/2019  1:12 PM     Author: Kasey Jones RN Service: -- Author Type: Registered Nurse    Filed: 4/1/2019  2:17 PM Encounter Date: 4/1/2019 Status: Signed    : Kasey Jones RN (Registered Nurse)       Type: pacemaker alert remote transmission for long AT/AF.  Presenting rhythm: AP-VS 60 bpm.  Battery/lead status: stable.  Arrhythmias: since 1/9/19; 3 AF episodes, longest lasting 11hrs on 3/31/19, v-rates >/=120bpm ~90%, AF burden 1%. 3 ventricular high rate episodes and 9 SVT episodes, these all appear to be AF with RVR.  Anticoagulant: warfarin.  Comments: normal pacemaker function. Routed to device RN for review. CARINA      Transmission reviewed, agree with above. Known PAF, low AF burden, since device implanted in October 2018. Episode of AF was recorded Saturday night into Sunday, lasting total of 11 hours with average v-rate of 130s, max rates 170s.     Reviewed with patient, he denies any awareness of recent rhythm changes. Confirms Warfarin compliance. Advised to call with any changes in symptoms and we can check a remote transmission from home. He agrees.     Will update Dr. Rangel, AF burden is low but rates while in AF on faster side. See histograms/AF burden graph below.             Kasey Jones RN            Denies/no

## 2025-01-28 NOTE — ASU DISCHARGE PLAN (ADULT/PEDIATRIC) - NURSING INSTRUCTIONS
You were given IV Tylenol (1000mg) for pain management in the Operating Room.  Please do NOT take any additonal tylenol/acetaminophen products (Percocet, Vicodin, Excedrin) for the next 6-8 hours (after 3:35PM). Please do not take tylenol AND percocet/vicodin/excedrin as narcotic prescription already contains tylenol.

## 2025-01-29 ENCOUNTER — TRANSCRIPTION ENCOUNTER (OUTPATIENT)
Age: 75
End: 2025-01-29

## 2025-01-29 PROBLEM — C61 MALIGNANT NEOPLASM OF PROSTATE: Chronic | Status: ACTIVE | Noted: 2025-01-22

## 2025-01-30 ENCOUNTER — TRANSCRIPTION ENCOUNTER (OUTPATIENT)
Age: 75
End: 2025-01-30

## 2025-02-25 ENCOUNTER — TRANSCRIPTION ENCOUNTER (OUTPATIENT)
Age: 75
End: 2025-02-25

## 2025-02-25 DIAGNOSIS — R31.0 GROSS HEMATURIA: ICD-10-CM

## 2025-02-26 ENCOUNTER — TRANSCRIPTION ENCOUNTER (OUTPATIENT)
Age: 75
End: 2025-02-26

## 2025-03-02 LAB
APPEARANCE: CLEAR
BACTERIA UR CULT: NORMAL
BACTERIA: NEGATIVE /HPF
BILIRUBIN URINE: NEGATIVE
BLOOD URINE: ABNORMAL
CAST: 0 /LPF
COLOR: YELLOW
EPITHELIAL CELLS: 0 /HPF
GLUCOSE QUALITATIVE U: NEGATIVE MG/DL
KETONES URINE: NEGATIVE MG/DL
LEUKOCYTE ESTERASE URINE: NEGATIVE
MICROSCOPIC-UA: NORMAL
NITRITE URINE: NEGATIVE
PH URINE: 5.5
PROTEIN URINE: 30 MG/DL
RED BLOOD CELLS URINE: 23 /HPF
SPECIFIC GRAVITY URINE: 1.02
UROBILINOGEN URINE: 0.2 MG/DL
WHITE BLOOD CELLS URINE: 0 /HPF

## 2025-03-07 ENCOUNTER — NON-APPOINTMENT (OUTPATIENT)
Age: 75
End: 2025-03-07

## 2025-03-11 ENCOUNTER — APPOINTMENT (OUTPATIENT)
Dept: UROLOGY | Facility: CLINIC | Age: 75
End: 2025-03-11
Payer: MEDICARE

## 2025-03-11 VITALS — SYSTOLIC BLOOD PRESSURE: 138 MMHG | DIASTOLIC BLOOD PRESSURE: 93 MMHG | OXYGEN SATURATION: 97 % | HEART RATE: 90 BPM

## 2025-03-11 DIAGNOSIS — Z87.898 PERSONAL HISTORY OF OTHER SPECIFIED CONDITIONS: ICD-10-CM

## 2025-03-11 DIAGNOSIS — C61 MALIGNANT NEOPLASM OF PROSTATE: ICD-10-CM

## 2025-03-11 PROCEDURE — 99024 POSTOP FOLLOW-UP VISIT: CPT

## 2025-03-12 PROBLEM — Z87.898 HISTORY OF ELEVATED PROSTATE SPECIFIC ANTIGEN (PSA): Status: RESOLVED | Noted: 2020-08-18 | Resolved: 2025-03-12

## 2025-04-14 ENCOUNTER — TRANSCRIPTION ENCOUNTER (OUTPATIENT)
Age: 75
End: 2025-04-14

## 2025-04-14 RX ORDER — HYDROCORTISONE 25 MG/G
2.5 CREAM TOPICAL
Qty: 1 | Refills: 2 | Status: ACTIVE | COMMUNITY
Start: 2025-04-14 | End: 1900-01-01

## 2025-04-28 ENCOUNTER — TRANSCRIPTION ENCOUNTER (OUTPATIENT)
Age: 75
End: 2025-04-28

## 2025-08-23 LAB
PSA FREE FLD-MCNC: 38 %
PSA FREE SERPL-MCNC: 0.29 NG/ML
PSA SERPL-MCNC: 0.76 NG/ML

## 2025-09-02 ENCOUNTER — TRANSCRIPTION ENCOUNTER (OUTPATIENT)
Age: 75
End: 2025-09-02

## 2025-09-03 ENCOUNTER — APPOINTMENT (OUTPATIENT)
Dept: UROLOGY | Facility: CLINIC | Age: 75
End: 2025-09-03
Payer: MEDICARE

## 2025-09-03 DIAGNOSIS — C61 MALIGNANT NEOPLASM OF PROSTATE: ICD-10-CM

## 2025-09-03 PROCEDURE — 99213 OFFICE O/P EST LOW 20 MIN: CPT | Mod: 2W

## 2025-09-03 PROCEDURE — G2211 COMPLEX E/M VISIT ADD ON: CPT | Mod: 2W

## 2025-09-04 ENCOUNTER — APPOINTMENT (OUTPATIENT)
Dept: UROLOGY | Facility: CLINIC | Age: 75
End: 2025-09-04

## (undated) DEVICE — SYR TOOMEY 50ML

## (undated) DEVICE — TUBING TUR 2 PRONG

## (undated) DEVICE — DRAINAGE BAG URINARY 2L

## (undated) DEVICE — FOLEY CATH 2-WAY 16FR 5CC LATEX LUBRICATH

## (undated) DEVICE — GRID BRACHYTHERAPY EZ 14G

## (undated) DEVICE — FOLEY HOLDER STATLOCK 2 WAY ADULT

## (undated) DEVICE — SYR LUER LOK 10CC

## (undated) DEVICE — SYR LUER LOK 20CC

## (undated) DEVICE — TUBING IV EXTENSION MICROBORE W MICROCLAVE 7"

## (undated) DEVICE — BASIN SET DOUBLE

## (undated) DEVICE — SYR LUER LOK 50CC

## (undated) DEVICE — ADAPTER CHECK FLO 9FR STERILE

## (undated) DEVICE — NEOGUARD ENDOCAVITY PROBE COVER 1 X 11.8"

## (undated) DEVICE — Device